# Patient Record
Sex: FEMALE | Race: WHITE | URBAN - METROPOLITAN AREA
[De-identification: names, ages, dates, MRNs, and addresses within clinical notes are randomized per-mention and may not be internally consistent; named-entity substitution may affect disease eponyms.]

---

## 2017-02-13 DIAGNOSIS — Z76.0 MEDICATION REFILL: ICD-10-CM

## 2017-02-13 RX ORDER — LORAZEPAM 0.5 MG/1
0.5 TABLET ORAL
Qty: 60 TAB | Refills: 0 | OUTPATIENT
Start: 2017-02-13

## 2017-02-13 NOTE — TELEPHONE ENCOUNTER
Patient request, last /16 ( has been seen twice downstairs since then but not with PCP). Requested Prescriptions     Pending Prescriptions Disp Refills    LORazepam (ATIVAN) 0.5 mg tablet 60 Tab 0     Sig: Take 1 Tab by mouth every eight (8) hours as needed for Anxiety.

## 2017-02-14 NOTE — TELEPHONE ENCOUNTER
reviewed. Medication was last filled in March 2016, #60. An appointment is needed to further discuss prior to refill.

## 2017-02-27 ENCOUNTER — OFFICE VISIT (OUTPATIENT)
Dept: INTERNAL MEDICINE CLINIC | Age: 67
End: 2017-02-27

## 2017-02-27 VITALS
HEIGHT: 62 IN | RESPIRATION RATE: 20 BRPM | HEART RATE: 90 BPM | BODY MASS INDEX: 20.98 KG/M2 | WEIGHT: 114 LBS | DIASTOLIC BLOOD PRESSURE: 80 MMHG | SYSTOLIC BLOOD PRESSURE: 108 MMHG | TEMPERATURE: 96.2 F | OXYGEN SATURATION: 98 %

## 2017-02-27 DIAGNOSIS — Z76.0 MEDICATION REFILL: ICD-10-CM

## 2017-02-27 DIAGNOSIS — F41.9 ANXIETY: Primary | ICD-10-CM

## 2017-02-27 RX ORDER — LOTEPREDNOL ETABONATE 5 MG/ML
1 SUSPENSION/ DROPS OPHTHALMIC 4 TIMES DAILY
Qty: 15 ML | Refills: 2 | Status: SHIPPED | OUTPATIENT
Start: 2017-02-27 | End: 2018-01-22 | Stop reason: SDUPTHER

## 2017-02-27 RX ORDER — BEPOTASTINE BESILATE 15 MG/ML
1 SOLUTION/ DROPS OPHTHALMIC
Qty: 10 ML | Refills: 2 | Status: SHIPPED | OUTPATIENT
Start: 2017-02-27 | End: 2018-01-22 | Stop reason: SDUPTHER

## 2017-02-27 RX ORDER — LORAZEPAM 0.5 MG/1
0.5 TABLET ORAL
Qty: 60 TAB | Refills: 0 | Status: SHIPPED | OUTPATIENT
Start: 2017-02-27 | End: 2018-01-11 | Stop reason: SDUPTHER

## 2017-02-27 NOTE — PROGRESS NOTES
HISTORY OF PRESENT ILLNESS  Carter Lancaster is a 61 y.o. female. HPI Comments: 62 yo female here for f/u of anxiety. Chronic issue. More stress recently with spouse's dx of melanoma. Takes lorazepam prn which has been effective. Also meditates which helps. Needs eye drops refilled for allergic conjunctivitis. Has been controlled. Review of Systems   Constitutional: Negative for chills, fever and weight loss. HENT: Negative for congestion. Eyes: Negative for blurred vision, pain and discharge. Respiratory: Negative for cough and shortness of breath. Cardiovascular: Negative for chest pain, palpitations and leg swelling. Gastrointestinal: Negative for nausea and vomiting. Musculoskeletal: Positive for joint pain. Neurological: Negative for dizziness, tingling and headaches. Psychiatric/Behavioral: Negative for depression. The patient is not nervous/anxious. Past Medical History:   Diagnosis Date    Anxiety     History of shingles     since age 17    Migraine      Current Outpatient Prescriptions on File Prior to Visit   Medication Sig Dispense Refill    EVAMIST 1.53 mg/spray (1.7%) spry       valACYclovir (VALTREX) 500 mg tablet Take 1 Tab by mouth two (2) times a day. 30 Tab 5    tretinoin (RETIN-A) 0.05 % topical cream APPLY A THIN FILM TOPICALLY EVERY EVENING 45 g 2    SUMAtriptan (IMITREX) 100 mg tablet Take 1 Tab by mouth once as needed for Migraine for up to 1 dose. 10 Tab 2    LORazepam (ATIVAN) 0.5 mg tablet Take 1 Tab by mouth every eight (8) hours as needed for Anxiety. 60 Tab 0    loratadine-pseudoephedrine (CLARITIN-D 24-HOUR)  mg per tablet Take 1 Tab by mouth daily.  SUMAtriptan (IMITREX) 20 mg/actuation nasal spray SPRAY 1 SPRAY IN BOTH NOSTRILS AS NEEDED FOR MIGRAINE (MAXIMUM OF 40MG IN 24HRS) 6 Container 1    butalbital-aspirin-caffeine (FIORINAL) -40 mg tablet Take 1 Tab by mouth every six (6) hours as needed for Pain.  30 Tab 1    mupirocin (BACTROBAN) 2 % ointment Apply  to affected area daily. 22 g 0     No current facility-administered medications on file prior to visit. Physical Exam   Constitutional: She is oriented to person, place, and time. She appears well-developed and well-nourished. No distress. /80 (BP 1 Location: Left arm, BP Patient Position: Sitting)  Pulse 90  Temp 96.2 °F (35.7 °C) (Oral)   Resp 20  Ht 5' 2\" (1.575 m)  Wt 114 lb (51.7 kg)  SpO2 98%  BMI 20.85 kg/m2     Eyes: EOM are normal. Right eye exhibits no discharge. Left eye exhibits no discharge. No scleral icterus. Cardiovascular: Normal rate, regular rhythm and normal heart sounds. Exam reveals no gallop and no friction rub. No murmur heard. Pulmonary/Chest: Effort normal and breath sounds normal. No respiratory distress. She has no wheezes. She has no rales. Neurological: She is alert and oriented to person, place, and time. Skin: Skin is warm and dry. Psychiatric: She has a normal mood and affect. No results found for: NA, K, CL, CO2, AGAP, GLU, BUN, CREA, BUCR, GFRAA, GFRNA, CA, TBIL, TBILI, GPT, SGOT, AP, TP, ALB, GLOB, AGRAT, ALT   No results found for: CHOL, CHOLPOCT, CHOLX, CHLST, CHOLV, HDL, HDLPOC, LDL, LDLCPOC, NLDLCT, DLDL, LDLC, DLDLP, VLDLC, VLDL, TGL, TGLX, TRIGL, [id number], TRIGP, TGLPOCT, CHHD, CHHDX  ASSESSMENT and PLAN    ICD-10-CM ICD-9-CM    1. Anxiety F41.9 300.00    2. Medication refill Z76.0 V68.1 LORazepam (ATIVAN) 0.5 mg tablet   Continue with lorazepam at current dose for now. Continue with nonpharm measures such as meditation. Eye drops refilled for allergic sx.

## 2017-02-27 NOTE — PATIENT INSTRUCTIONS

## 2017-02-27 NOTE — MR AVS SNAPSHOT
Visit Information Date & Time Provider Department Dept. Phone Encounter #  
 2/27/2017  7:45 AM [address] [phone] [id number] Follow-up Instructions Return if symptoms worsen or fail to improve, for anxiety. Upcoming Health Maintenance Date Due Hepatitis C Screening 1950 COLONOSCOPY 3/14/1970 DTaP/Tdap/Td series (1 - Tdap) 3/14/1973 BREAST CANCER SCRN MAMMOGRAM 3/14/2002 ZOSTER VACCINE AGE 60> 3/14/2012 INFLUENZA AGE 9 TO ADULT 8/1/2016 Allergies as of 2/27/2017  Review Complete On: 2/27/2017 By: Cayden Petersen LPN Severity Noted Reaction Type Reactions Sulfa (Sulfonamide Antibiotics)  03/02/2010    Unknown (comments) Pt says that she is allergic to \"all antibiotics except Amoxicillin\" Current Immunizations  Never Reviewed No immunizations on file. Not reviewed this visit You Were Diagnosed With   
  
 Codes Comments Anxiety    -  Primary ICD-10-CM: F41.9 ICD-9-CM: 300.00 Medication refill     ICD-10-CM: Z76.0 ICD-9-CM: V68.1 Vitals BP  
  
  
  
  
  
 108/80 (BP 1 Location: Left arm, BP Patient Position: Sitting) BMI and BSA Data Body Mass Index Body Surface Area  
 20.85 kg/m 2 1.5 m 2 Preferred Pharmacy Pharmacy Name Phone [address] [phone] Your Updated Medication List  
  
   
This list is accurate as of: 2/27/17  8:28 AM.  Always use your most recent med list.  
  
  
  
  
 Bepotastine Besilate 1.5 % Drop Commonly known as:  Renetta Loss Apply 1 Drop to eye two (2) times daily as needed. butalbital-aspirin-caffeine -40 mg tablet Commonly known as:  Jacquenettkaren Whitley Take 1 Tab by mouth every six (6) hours as needed for Pain. EVAMIST 1.53 mg/spray (1.7%) Spry Generic drug:  Estradiol  
  
 loratadine-pseudoephedrine  mg per tablet Commonly known as:  CLARITIN-D 24-hour Take 1 Tab by mouth daily. LORazepam 0.5 mg tablet Commonly known as:  ATIVAN Take 1 Tab by mouth every eight (8) hours as needed for Anxiety. Indications: ANXIETY  
  
 loteprednol etabonate 0.5 % ophthalmic suspension Commonly known as:  Michela Prieto Administer 1 Drop to both eyes four (4) times daily. Indications: ALLERGIC CONJUNCTIVITIS  
  
 mupirocin 2 % ointment Commonly known as:  [employer] Apply  to affected area daily. SUMAtriptan 100 mg tablet Commonly known as:  IMITREX Take 1 Tab by mouth once as needed for Migraine for up to 1 dose. SUMAtriptan 20 mg/actuation nasal spray Commonly known as:  IMITREX  
SPRAY 1 SPRAY IN BOTH NOSTRILS AS NEEDED FOR MIGRAINE (MAXIMUM OF 40MG IN 24HRS)  
  
 tretinoin 0.05 % topical cream  
Commonly known as:  RETIN-A  
APPLY A THIN FILM TOPICALLY EVERY EVENING  
  
 valACYclovir 500 mg tablet Commonly known as:  VALTREX Take 1 Tab by mouth two (2) times a day. Prescriptions Printed Refills LORazepam (ATIVAN) 0.5 mg tablet 0 Sig: Take 1 Tab by mouth every eight (8) hours as needed for Anxiety. Indications: ANXIETY Class: Print Route: Oral  
  
Prescriptions Sent to Pharmacy Refills  
 loteprednol etabonate (LOTEMAX) 0.5 % ophthalmic suspension 2 Sig: Administer 1 Drop to both eyes four (4) times daily. Indications: ALLERGIC CONJUNCTIVITIS Class: Normal  
 Pharmacy: [address] [place] [address] [facility], [address] Ph #: [phone] Route: Both Eyes Bepotastine Besilate (BEPREVE) 1.5 % drop 2 Sig: Apply 1 Drop to eye two (2) times daily as needed. Class: Normal  
 Pharmacy: [facility], [address] Ph #: [phone] Route: Ophthalmic Follow-up Instructions Return if symptoms worsen or fail to improve, for anxiety. Patient Instructions Anxiety Disorder: Care Instructions Your Care Instructions Anxiety is a normal reaction to stress. Difficult situations can cause you to have symptoms such as sweaty palms and a nervous feeling. In an anxiety disorder, the symptoms are far more severe. Constant worry, muscle tension, trouble sleeping, nausea and diarrhea, and other symptoms can make normal daily activities difficult or impossible. These symptoms may occur for no reason, and they can affect your work, school, or social life. Medicines, counseling, and self-care can all help. Follow-up care is a key part of your treatment and safety. Be sure to make and go to all appointments, and call your doctor if you are having problems. It's also a good idea to know your test results and keep a list of the medicines you take. How can you care for yourself at home? · Take medicines exactly as directed. Call your doctor if you think you are having a problem with your medicine. · Go to your counseling sessions and follow-up appointments. · Recognize and accept your anxiety. Then, when you are in a situation that makes you anxious, say to yourself, \"This is not an emergency. I feel uncomfortable, but I am not in danger. I can keep going even if I feel anxious. \" · Be kind to your body: ¨ Relieve tension with exercise or a massage. ¨ Get enough rest. 
¨ Avoid alcohol, caffeine, nicotine, and illegal drugs. They can increase your anxiety level and cause sleep problems. ¨ Learn and do relaxation techniques. See below for more about these techniques. · Engage your mind. Get out and do something you enjoy. Go to a funny movie, or take a walk or hike. Plan your day. Having too much or too little to do can make you anxious. · Keep a record of your symptoms. Discuss your fears with a good friend or family member, or join a support group for people with similar problems. Talking to others sometimes relieves stress. · Get involved in social groups, or volunteer to help others.  Being alone sometimes makes things seem worse than they are. · Get at least 30 minutes of exercise on most days of the week to relieve stress. Walking is a good choice. You also may want to do other activities, such as running, swimming, cycling, or playing tennis or team sports. Relaxation techniques Do relaxation exercises 10 to 20 minutes a day. You can play soothing, relaxing music while you do them, if you wish. · Tell others in your house that you are going to do your relaxation exercises. Ask them not to disturb you. · Find a comfortable place, away from all distractions and noise. · Lie down on your back, or sit with your back straight. · Focus on your breathing. Make it slow and steady. · Breathe in through your nose. Breathe out through either your nose or mouth. · Breathe deeply, filling up the area between your navel and your rib cage. Breathe so that your belly goes up and down. · Do not hold your breath. · Breathe like this for 5 to 10 minutes. Notice the feeling of calmness throughout your whole body. As you continue to breathe slowly and deeply, relax by doing the following for another 5 to 10 minutes: · Tighten and relax each muscle group in your body. You can begin at your toes and work your way up to your head. · Imagine your muscle groups relaxing and becoming heavy. · Empty your mind of all thoughts. · Let yourself relax more and more deeply. · Become aware of the state of calmness that surrounds you. · When your relaxation time is over, you can bring yourself back to alertness by moving your fingers and toes and then your hands and feet and then stretching and moving your entire body. Sometimes people fall asleep during relaxation, but they usually wake up shortly afterward. · Always give yourself time to return to full alertness before you drive a car or do anything that might cause an accident if you are not fully alert. Never play a relaxation tape while you drive a car. When should you call for help? Call 911 anytime you think you may need emergency care. For example, call if: 
· You feel you cannot stop from hurting yourself or someone else. Keep the numbers for these national suicide hotlines: 4-161-719-TALK (8-834-880-360.316.3687) and 4-700-CVUOQYO (8-112.186.5095). If you or someone you know talks about suicide or feeling hopeless, get help right away. Watch closely for changes in your health, and be sure to contact your doctor if: 
· You have anxiety or fear that affects your life. · You have symptoms of anxiety that are new or different from those you had before. Where can you learn more? Go to http://horacio-yordan.info/. Enter P754 in the search box to learn more about \"Anxiety Disorder: Care Instructions. \" Current as of: July 26, 2016 Content Version: 11.1 © 2607-8916 [employer]. Care instructions adapted under license by [employer] (which disclaims liability or warranty for this information). If you have questions about a medical condition or this instruction, always ask your healthcare professional. [address] any warranty or liability for your use of this information. Introducing [address]! Romayne Duster introduces [employer] patient portal. Now you can access parts of your medical record, email your doctor's office, and request medication refills online. 1. In your internet browser, go to [email or web address] 2. Click on the First Time User? Click Here link in the Sign In box. You will see the New Member Sign Up page. 3. Enter your [employer] Access Code exactly as it appears below. You will not need to use this code after youve completed the sign-up process. If you do not sign up before the expiration date, you must request a new code. · [employer] Access Code: [email or web address] Expires: 5/28/2017  7:51 AM 
 
 4. Enter the last four digits of your Social Security Number (xxxx) and Date of Birth (mm/dd/yyyy) as indicated and click Submit. You will be taken to the next sign-up page. 5. Create a RetAPPs ID. This will be your [employer] login ID and cannot be changed, so think of one that is secure and easy to remember. 6. Create a [employer] password. You can change your password at any time. 7. Enter your Password Reset Question and Answer. This can be used at a later time if you forget your password. 8. Enter your e-mail address. You will receive e-mail notification when new information is available in [address] 9. Click Sign Up. You can now view and download portions of your medical record. 10. Click the Download Summary menu link to download a portable copy of your medical information. If you have questions, please visit the Frequently Asked Questions section of the RetAPPs website. Remember, [employer] is NOT to be used for urgent needs. For medical emergencies, dial 911. Now available from your iPhone and Android! Please provide this summary of care documentation to your next provider. Your primary care clinician is listed as Elias Prescott. If you have any questions after today's visit, please call [phone].

## 2017-02-27 NOTE — PROGRESS NOTES
Patient presents today establish care with Dr. Dennie Mews and medication refills  Pt preferred language for healthcare discussion is english. Do you have an advanced directive? No  Is someone accompanying this pt? If so who? No   Is the patient using any DME equipment during OV? No What equipment? 1. Have you been to the ER, urgent care clinic since your last visit? Hospitalized since your last visit?  No

## 2017-08-15 DIAGNOSIS — Z76.0 MEDICATION REFILL: ICD-10-CM

## 2017-08-16 RX ORDER — TRETINOIN 0.5 MG/G
CREAM TOPICAL
Qty: 45 G | Refills: 1 | Status: SHIPPED | OUTPATIENT
Start: 2017-08-16 | End: 2018-01-22 | Stop reason: SDUPTHER

## 2017-08-17 DIAGNOSIS — Z76.0 MEDICATION REFILL: ICD-10-CM

## 2017-08-17 RX ORDER — SUMATRIPTAN 20 MG/1
SPRAY NASAL
Qty: 6 CONTAINER | Refills: 1 | Status: SHIPPED | OUTPATIENT
Start: 2017-08-17 | End: 2017-09-01 | Stop reason: SDUPTHER

## 2017-08-17 NOTE — TELEPHONE ENCOUNTER
Requested Prescriptions     Pending Prescriptions Disp Refills    SUMAtriptan (IMITREX) 20 mg/actuation nasal spray 6 Container 1     Sig: SPRAY 1 SPRAY IN BOTH NOSTRILS AS NEEDED FOR MIGRAINE (MAXIMUM OF 40MG IN 24HRS)

## 2017-09-01 DIAGNOSIS — Z76.0 MEDICATION REFILL: ICD-10-CM

## 2017-09-01 RX ORDER — SUMATRIPTAN 20 MG/1
SPRAY NASAL
Qty: 6 CONTAINER | Refills: 1 | Status: SHIPPED | OUTPATIENT
Start: 2017-09-01 | End: 2018-01-22 | Stop reason: SDUPTHER

## 2017-12-07 DIAGNOSIS — Z86.19 HISTORY OF COLD SORES: ICD-10-CM

## 2017-12-07 DIAGNOSIS — Z76.0 MEDICATION REFILL: ICD-10-CM

## 2017-12-07 RX ORDER — VALACYCLOVIR HYDROCHLORIDE 500 MG/1
TABLET, FILM COATED ORAL
Qty: 30 TAB | Refills: 5 | Status: SHIPPED | OUTPATIENT
Start: 2017-12-07 | End: 2018-01-22 | Stop reason: SDUPTHER

## 2018-01-11 ENCOUNTER — OFFICE VISIT (OUTPATIENT)
Dept: INTERNAL MEDICINE CLINIC | Age: 68
End: 2018-01-11

## 2018-01-11 VITALS
HEIGHT: 62 IN | HEART RATE: 100 BPM | TEMPERATURE: 98.9 F | RESPIRATION RATE: 14 BRPM | DIASTOLIC BLOOD PRESSURE: 76 MMHG | BODY MASS INDEX: 19.51 KG/M2 | OXYGEN SATURATION: 96 % | WEIGHT: 106 LBS | SYSTOLIC BLOOD PRESSURE: 145 MMHG

## 2018-01-11 DIAGNOSIS — Z76.0 MEDICATION REFILL: ICD-10-CM

## 2018-01-11 DIAGNOSIS — R05.9 COUGH: Primary | ICD-10-CM

## 2018-01-11 DIAGNOSIS — J40 BRONCHITIS: ICD-10-CM

## 2018-01-11 RX ORDER — LORAZEPAM 0.5 MG/1
0.5 TABLET ORAL
Qty: 60 TAB | Refills: 0 | Status: SHIPPED | OUTPATIENT
Start: 2018-01-11 | End: 2018-01-22 | Stop reason: SDUPTHER

## 2018-01-11 RX ORDER — DOXYCYCLINE 100 MG/1
100 CAPSULE ORAL 2 TIMES DAILY
Qty: 20 CAP | Refills: 0 | Status: SHIPPED | OUTPATIENT
Start: 2018-01-11 | End: 2018-01-21

## 2018-01-11 NOTE — PROGRESS NOTES
HISTORY OF PRESENT ILLNESS  Mariza Curtis is a 74 y.o. female. Visit Vitals    /76 (BP 1 Location: Right arm, BP Patient Position: Sitting)    Pulse 100    Temp 98.9 °F (37.2 °C) (Oral)    Resp 14    Ht 5' 2\" (1.575 m)    Wt 106 lb (48.1 kg)    SpO2 96%    BMI 19.39 kg/m2       HPI Comments: About 10 days ago developed a cough with low grade fever. Extreme fatigue. Tried a lot of home remedies and just is not getting better. Left lower rib cage hurts    Cough   The history is provided by the patient. This is a new problem. The current episode started more than 1 week ago. The problem occurs daily. The problem has not changed since onset. Nothing relieves the symptoms. Treatments tried: mult home remedies. The treatment provided no relief. Review of Systems   Constitutional: Positive for diaphoresis, fever and malaise/fatigue. HENT: Positive for congestion. Respiratory: Positive for cough and sputum production. Cardiovascular:        Left rib cage pain       Physical Exam   Constitutional: She is oriented to person, place, and time. She appears well-developed and well-nourished. She appears ill. HENT:   Right Ear: Tympanic membrane, external ear and ear canal normal.   Left Ear: Tympanic membrane, external ear and ear canal normal.   Mouth/Throat: Uvula is midline and oropharynx is clear and moist.   Cardiovascular: Normal rate and regular rhythm. Pulmonary/Chest: Effort normal. No respiratory distress. She has rales (musical rales centrally  lower half and heard best posteriorly. ). Musculoskeletal: She exhibits no edema. Lymphadenopathy:     She has no cervical adenopathy. Neurological: She is alert and oriented to person, place, and time. Skin: Skin is warm and dry. She is not diaphoretic. Psychiatric: She has a normal mood and affect. Nursing note and vitals reviewed. ASSESSMENT and PLAN    ICD-10-CM ICD-9-CM    1.  Cough R05 786.2 XR CHEST PA LAT      doxycycline (VIBRAMYCIN) 100 mg capsule   2. Bronchitis J40 490 XR CHEST PA LAT      doxycycline (VIBRAMYCIN) 100 mg capsule   3. Medication refill Z76.0 V68.1 LORazepam (ATIVAN) 0.5 mg tablet     Persistent cough with systemic sxs persisting over 10 days in spite of OTC txs   CXR clear to my exam    Pt is very sensitive to antibiotics but says she can take doxycycline    Start doxycycline 100 mg BID.  Continue the OTC Robitussin DM    Rest and fluids    F/u prn

## 2018-01-11 NOTE — PATIENT INSTRUCTIONS
Bronchitis: Care Instructions  Your Care Instructions    Bronchitis is inflammation of the bronchial tubes, which carry air to the lungs. The tubes swell and produce mucus, or phlegm. The mucus and inflamed bronchial tubes make you cough. You may have trouble breathing. Most cases of bronchitis are caused by viruses like those that cause colds. Antibiotics usually do not help and they may be harmful. Bronchitis usually develops rapidly and lasts about 2 to 3 weeks in otherwise healthy people. Follow-up care is a key part of your treatment and safety. Be sure to make and go to all appointments, and call your doctor if you are having problems. It's also a good idea to know your test results and keep a list of the medicines you take. How can you care for yourself at home? · Take all medicines exactly as prescribed. Call your doctor if you think you are having a problem with your medicine. · Get some extra rest.  · Take an over-the-counter pain medicine, such as acetaminophen (Tylenol), ibuprofen (Advil, Motrin), or naproxen (Aleve) to reduce fever and relieve body aches. Read and follow all instructions on the label. · Do not take two or more pain medicines at the same time unless the doctor told you to. Many pain medicines have acetaminophen, which is Tylenol. Too much acetaminophen (Tylenol) can be harmful. · Take an over-the-counter cough medicine that contains dextromethorphan to help quiet a dry, hacking cough so that you can sleep. Avoid cough medicines that have more than one active ingredient. Read and follow all instructions on the label. · Breathe moist air from a humidifier, hot shower, or sink filled with hot water. The heat and moisture will thin mucus so you can cough it out. · Do not smoke. Smoking can make bronchitis worse. If you need help quitting, talk to your doctor about stop-smoking programs and medicines. These can increase your chances of quitting for good.   When should you call for help? Call 911 anytime you think you may need emergency care. For example, call if:  ? · You have severe trouble breathing. ?Call your doctor now or seek immediate medical care if:  ? · You have new or worse trouble breathing. ? · You cough up dark brown or bloody mucus (sputum). ? · You have a new or higher fever. ? · You have a new rash. ? Watch closely for changes in your health, and be sure to contact your doctor if:  ? · You cough more deeply or more often, especially if you notice more mucus or a change in the color of your mucus. ? · You are not getting better as expected. Where can you learn more? Go to http://horacio-yordan.info/. Enter H333 in the search box to learn more about \"Bronchitis: Care Instructions. \"  Current as of: May 12, 2017  Content Version: 11.4  © 0133-1803 [employer]. Care instructions adapted under license by [employer] (which disclaims liability or warranty for this information). If you have questions about a medical condition or this instruction, always ask your healthcare professional. [address] any warranty or liability for your use of this information.

## 2018-01-11 NOTE — MR AVS SNAPSHOT
Visit Information Date & Time Provider Department Dept. Phone Encounter #  
 1/11/2018  2:15 PM [address] [phone] [id number] Follow-up Instructions Return if symptoms worsen or fail to improve, for or as appointed. Upcoming Health Maintenance Date Due Hepatitis C Screening 1950 COLONOSCOPY 3/14/1970 DTaP/Tdap/Td series (1 - Tdap) 3/14/1973 BREAST CANCER SCRN MAMMOGRAM 3/14/2002 ZOSTER VACCINE AGE 60> 1/14/2012 OSTEOPOROSIS SCREENING (DEXA) 3/14/2017 Pneumococcal 65+ Low/Medium Risk (1 of 2 - PCV13) 3/14/2017 MEDICARE YEARLY EXAM 3/14/2017 Influenza Age 5 to Adult 8/1/2017 GLAUCOMA SCREENING Q2Y 3/21/2018 Allergies as of 1/11/2018  Review Complete On: 1/11/2018 By: Rubi Willett MD  
  
 Severity Noted Reaction Type Reactions Keflex [Cephalexin]  01/11/2018    Rash And GI sxs Sulfa (Sulfonamide Antibiotics)  03/02/2010    Unknown (comments) Pt says that she is allergic to \"all antibiotics except Amoxicillin\" Zithromax [Azithromycin]  01/11/2018    Other (comments) GI upset Current Immunizations  Never Reviewed No immunizations on file. Not reviewed this visit You Were Diagnosed With   
  
 Codes Comments Cough    -  Primary ICD-10-CM: I67 ICD-9-CM: 786.2 Bronchitis     ICD-10-CM: J40 ICD-9-CM: 851 Vitals BP Pulse Temp Resp Height(growth percentile) Weight(growth percentile) 145/76 (BP 1 Location: Right arm, BP Patient Position: Sitting) 100 98.9 °F (37.2 °C) (Oral) 14 5' 2\" (1.575 m) 106 lb (48.1 kg) SpO2 BMI OB Status Smoking Status 96% 19.39 kg/m2 Hysterectomy Never Smoker BMI and BSA Data Body Mass Index Body Surface Area  
 19.39 kg/m 2 1.45 m 2 Preferred Pharmacy Pharmacy Name Phone [facility] - [address], [address] [phone] Your Updated Medication List  
  
   
 This list is accurate as of: 1/11/18  3:13 PM.  Always use your most recent med list.  
  
  
  
  
 Bepotastine Besilate 1.5 % Drop Commonly known as:  Christina Berg Apply 1 Drop to eye two (2) times daily as needed. butalbital-aspirin-caffeine -40 mg tablet Commonly known as:  Charma Jayleen Take 1 Tab by mouth every six (6) hours as needed for Pain. doxycycline 100 mg capsule Commonly known as:  VIBRAMYCIN Take 1 Cap by mouth two (2) times a day for 10 days. EVAMIST 1.53 mg/spray (1.7%) Spry Generic drug:  Estradiol  
  
 loratadine-pseudoephedrine  mg per tablet Commonly known as:  CLARITIN-D 24-hour Take 1 Tab by mouth daily. LORazepam 0.5 mg tablet Commonly known as:  ATIVAN Take 1 Tab by mouth every eight (8) hours as needed for Anxiety. Indications: ANXIETY  
  
 loteprednol etabonate 0.5 % ophthalmic suspension Commonly known as:  Tali Betty Administer 1 Drop to both eyes four (4) times daily. Indications: ALLERGIC CONJUNCTIVITIS  
  
 mupirocin 2 % ointment Commonly known as:  [employer] Apply  to affected area daily. RETIN-A 0.05 % topical cream  
Generic drug:  tretinoin APPLY THIN FILM TOPICALLY EVERY EVENING  
  
 SUMAtriptan 100 mg tablet Commonly known as:  IMITREX Take 1 Tab by mouth once as needed for Migraine for up to 1 dose. SUMAtriptan 20 mg/actuation nasal spray Commonly known as:  IMITREX  
SPRAY 1 SPRAY IN BOTH NOSTRILS AS NEEDED FOR MIGRAINE (MAXIMUM OF 40MG IN 24HRS)  
  
 valACYclovir 500 mg tablet Commonly known as:  VALTREX  
TAKE 1 TABLET TWICE A DAY Prescriptions Sent to Pharmacy Refills  
 doxycycline (VIBRAMYCIN) 100 mg capsule 0 Sig: Take 1 Cap by mouth two (2) times a day for 10 days. Class: Normal  
 Pharmacy: [address] [place], [place] [address]y [place], [address] Ph #: [phone] Route: Oral  
  
Follow-up Instructions Return if symptoms worsen or fail to improve, for or as appointed. To-Do List   
 01/11/2018 Imaging:  XR CHEST PA LAT Patient Instructions Bronchitis: Care Instructions Your Care Instructions Bronchitis is inflammation of the bronchial tubes, which carry air to the lungs. The tubes swell and produce mucus, or phlegm. The mucus and inflamed bronchial tubes make you cough. You may have trouble breathing. Most cases of bronchitis are caused by viruses like those that cause colds. Antibiotics usually do not help and they may be harmful. Bronchitis usually develops rapidly and lasts about 2 to 3 weeks in otherwise healthy people. Follow-up care is a key part of your treatment and safety. Be sure to make and go to all appointments, and call your doctor if you are having problems. It's also a good idea to know your test results and keep a list of the medicines you take. How can you care for yourself at home? · Take all medicines exactly as prescribed. Call your doctor if you think you are having a problem with your medicine. · Get some extra rest. 
· Take an over-the-counter pain medicine, such as acetaminophen (Tylenol), ibuprofen (Advil, Motrin), or naproxen (Aleve) to reduce fever and relieve body aches. Read and follow all instructions on the label. · Do not take two or more pain medicines at the same time unless the doctor told you to. Many pain medicines have acetaminophen, which is Tylenol. Too much acetaminophen (Tylenol) can be harmful. · Take an over-the-counter cough medicine that contains dextromethorphan to help quiet a dry, hacking cough so that you can sleep. Avoid cough medicines that have more than one active ingredient. Read and follow all instructions on the label. · Breathe moist air from a humidifier, hot shower, or sink filled with hot water. The heat and moisture will thin mucus so you can cough it out. · Do not smoke. Smoking can make bronchitis worse. If you need help quitting, talk to your doctor about stop-smoking programs and medicines. These can increase your chances of quitting for good. When should you call for help? Call 911 anytime you think you may need emergency care. For example, call if: 
? · You have severe trouble breathing. ?Call your doctor now or seek immediate medical care if: 
? · You have new or worse trouble breathing. ? · You cough up dark brown or bloody mucus (sputum). ? · You have a new or higher fever. ? · You have a new rash. ? Watch closely for changes in your health, and be sure to contact your doctor if: 
? · You cough more deeply or more often, especially if you notice more mucus or a change in the color of your mucus. ? · You are not getting better as expected. Where can you learn more? Go to http://horacio-yordan.info/. Enter H333 in the search box to learn more about \"Bronchitis: Care Instructions. \" Current as of: May 12, 2017 Content Version: 11.4 © 0775-5732 [employer]. Care instructions adapted under license by [employer] (which disclaims liability or warranty for this information). If you have questions about a medical condition or this instruction, always ask your healthcare professional. [address] any warranty or liability for your use of this information. Introducing [facility]! [facility] introduces [employer] patient portal. Now you can access parts of your medical record, email your doctor's office, and request medication refills online. 1. In your internet browser, go to [email or web address] 2. Click on the First Time User? Click Here link in the Sign In box. You will see the New Member Sign Up page. 3. Enter your [employer] Access Code exactly as it appears below. You will not need to use this code after youve completed the sign-up process.  If you do not sign up before the expiration date, you must request a new code. · Instabug Access Code: [facility] Expires: 4/11/2018  3:13 PM 
 
4. Enter the last four digits of your Social Security Number (xxxx) and Date of Birth (mm/dd/yyyy) as indicated and click Submit. You will be taken to the next sign-up page. 5. Create a Instabug ID. This will be your Instabug login ID and cannot be changed, so think of one that is secure and easy to remember. 6. Create a Instabug password. You can change your password at any time. 7. Enter your Password Reset Question and Answer. This can be used at a later time if you forget your password. 8. Enter your e-mail address. You will receive e-mail notification when new information is available in [address] 9. Click Sign Up. You can now view and download portions of your medical record. 10. Click the Download Summary menu link to download a portable copy of your medical information. If you have questions, please visit the Frequently Asked Questions section of the Inst[employer] website. Remember, Inst[employer]ug is NOT to be used for urgent needs. For medical emergencies, dial 911. Now available from your iPhone and Android! Please provide this summary of care documentation to your next provider. Your primary care clinician is listed as Elias [address]. If you have any questions after today's visit, please call [phone].

## 2018-01-11 NOTE — PROGRESS NOTES
Patient presents to the clinic for a cough that began 10 days ago. Patient complains of diarrhea, left side abdominal pain, lower back pain, body aches and fatigue. Patient also complains of a shingles outbreak. Flu Shot Requested: no    Depression Screening:  PHQ over the last two weeks 2/27/2017 7/13/2016 3/21/2016 3/16/2015   Little interest or pleasure in doing things Not at all Not at all Not at all Not at all   Feeling down, depressed or hopeless Not at all Not at all Not at all Not at all   Total Score PHQ 2 0 0 0 0       Learning Assessment:  Learning Assessment 3/16/2015   PRIMARY LEARNER Patient   HIGHEST LEVEL OF EDUCATION - PRIMARY LEARNER  4 YEARS OF COLLEGE   BARRIERS PRIMARY LEARNER NONE   PRIMARY LANGUAGE ENGLISH   LEARNER PREFERENCE PRIMARY READING   ANSWERED BY patrient   RELATIONSHIP SELF       Abuse Screening:  No flowsheet data found. Health Maintenance reviewed and discussed per provider: yes     Coordination of Care:    1. Have you been to the ER, urgent care clinic since your last visit? Hospitalized since your last visit? no    2. Have you seen or consulted any other health care providers outside of the [address] [place] [address] since your last visit? Include any pap smears or colon screening.  yes

## 2018-01-22 ENCOUNTER — OFFICE VISIT (OUTPATIENT)
Dept: INTERNAL MEDICINE CLINIC | Age: 68
End: 2018-01-22

## 2018-01-22 VITALS
HEIGHT: 62 IN | RESPIRATION RATE: 16 BRPM | BODY MASS INDEX: 20.24 KG/M2 | HEART RATE: 78 BPM | WEIGHT: 110 LBS | TEMPERATURE: 97 F | OXYGEN SATURATION: 98 % | DIASTOLIC BLOOD PRESSURE: 67 MMHG | SYSTOLIC BLOOD PRESSURE: 112 MMHG

## 2018-01-22 DIAGNOSIS — J06.9 UPPER RESPIRATORY TRACT INFECTION, UNSPECIFIED TYPE: ICD-10-CM

## 2018-01-22 DIAGNOSIS — F41.9 ANXIETY: Primary | ICD-10-CM

## 2018-01-22 DIAGNOSIS — Z86.19 HISTORY OF COLD SORES: ICD-10-CM

## 2018-01-22 DIAGNOSIS — Z76.0 MEDICATION REFILL: ICD-10-CM

## 2018-01-22 RX ORDER — LOTEPREDNOL ETABONATE 5 MG/ML
1 SUSPENSION/ DROPS OPHTHALMIC 4 TIMES DAILY
Qty: 15 ML | Refills: 2 | Status: SHIPPED | OUTPATIENT
Start: 2018-01-22 | End: 2018-09-06 | Stop reason: SDUPTHER

## 2018-01-22 RX ORDER — VALACYCLOVIR HYDROCHLORIDE 500 MG/1
TABLET, FILM COATED ORAL
Qty: 30 TAB | Refills: 5 | Status: CANCELLED | OUTPATIENT
Start: 2018-01-22

## 2018-01-22 RX ORDER — VALACYCLOVIR HYDROCHLORIDE 500 MG/1
TABLET, FILM COATED ORAL
Qty: 30 TAB | Refills: 5 | Status: SHIPPED | OUTPATIENT
Start: 2018-01-22 | End: 2018-09-06 | Stop reason: SDUPTHER

## 2018-01-22 RX ORDER — SUMATRIPTAN 20 MG/1
SPRAY NASAL
Qty: 6 CONTAINER | Refills: 1 | Status: SHIPPED | OUTPATIENT
Start: 2018-01-22 | End: 2018-09-06 | Stop reason: SDUPTHER

## 2018-01-22 RX ORDER — ESTRADIOL 1.53 MG/1
3 SPRAY TRANSDERMAL DAILY
Qty: 1 BOTTLE | Refills: 3 | Status: SHIPPED | OUTPATIENT
Start: 2018-01-22 | End: 2018-05-23 | Stop reason: SDUPTHER

## 2018-01-22 RX ORDER — TRETINOIN 0.5 MG/G
CREAM TOPICAL
Qty: 45 G | Refills: 1 | Status: SHIPPED | OUTPATIENT
Start: 2018-01-22 | End: 2018-09-06 | Stop reason: SDUPTHER

## 2018-01-22 RX ORDER — LORAZEPAM 0.5 MG/1
0.5 TABLET ORAL
Qty: 60 TAB | Refills: 0 | Status: SHIPPED | OUTPATIENT
Start: 2018-01-22 | End: 2018-09-06 | Stop reason: SDUPTHER

## 2018-01-22 RX ORDER — BEPOTASTINE BESILATE 15 MG/ML
1 SOLUTION/ DROPS OPHTHALMIC
Qty: 10 ML | Refills: 2 | Status: SHIPPED | OUTPATIENT
Start: 2018-01-22 | End: 2018-02-09 | Stop reason: CLARIF

## 2018-01-22 NOTE — PROGRESS NOTES
HISTORY OF PRESENT ILLNESS  Palomo Mackenzie is a 74 y.o. female. HPI Comments: 75 yo female here for f/u of anxiety, shingles, recent URI. Treated with abx with improvement of sx. Stamina improving. Anxiety sx stable. Using ativan prn. H/o shingles. Valtrex prn. Discussed new rx Shingrix      Review of Systems   Constitutional: Negative for chills, fever and weight loss. HENT: Negative for congestion and ear pain. Eyes: Negative for blurred vision and pain. Respiratory: Positive for cough. Negative for shortness of breath. Cardiovascular: Negative for chest pain, palpitations and leg swelling. Gastrointestinal: Negative for nausea and vomiting. Genitourinary: Negative for frequency and urgency. Musculoskeletal: Negative for joint pain and myalgias. Skin: Negative for itching and rash. Neurological: Negative for dizziness, tingling and headaches. Psychiatric/Behavioral: Negative for depression. The patient is not nervous/anxious. Past Medical History:   Diagnosis Date    Anxiety     History of shingles     since age 17    Migraine      Current Outpatient Prescriptions on File Prior to Visit   Medication Sig Dispense Refill    LORazepam (ATIVAN) 0.5 mg tablet Take 1 Tab by mouth every eight (8) hours as needed for Anxiety. Indications: anxiety 60 Tab 0    valACYclovir (VALTREX) 500 mg tablet TAKE 1 TABLET TWICE A DAY 30 Tab 5    SUMAtriptan (IMITREX) 20 mg/actuation nasal spray SPRAY 1 SPRAY IN BOTH NOSTRILS AS NEEDED FOR MIGRAINE (MAXIMUM OF 40MG IN 24HRS) 6 Container 1    RETIN-A 0.05 % topical cream APPLY THIN FILM TOPICALLY EVERY EVENING 45 g 1    loteprednol etabonate (LOTEMAX) 0.5 % ophthalmic suspension Administer 1 Drop to both eyes four (4) times daily. Indications: ALLERGIC CONJUNCTIVITIS 15 mL 2    Bepotastine Besilate (BEPREVE) 1.5 % drop Apply 1 Drop to eye two (2) times daily as needed.  10 mL 2    EVAMIST 1.53 mg/spray (1.7%) spry       mupirocin (BACTROBAN) 2 % ointment Apply  to affected area daily. 22 g 0    SUMAtriptan (IMITREX) 100 mg tablet Take 1 Tab by mouth once as needed for Migraine for up to 1 dose. 10 Tab 2    loratadine-pseudoephedrine (CLARITIN-D 24-HOUR)  mg per tablet Take 1 Tab by mouth daily.  butalbital-aspirin-caffeine (FIORINAL) -40 mg tablet Take 1 Tab by mouth every six (6) hours as needed for Pain. 30 Tab 1    [] doxycycline (VIBRAMYCIN) 100 mg capsule Take 1 Cap by mouth two (2) times a day for 10 days. 20 Cap 0     No current facility-administered medications on file prior to visit. Social History   Substance Use Topics    Smoking status: Never Smoker    Smokeless tobacco: Never Used    Alcohol use No     Physical Exam   Constitutional: She appears well-developed and well-nourished. No distress. /67 (BP 1 Location: Right arm, BP Patient Position: Sitting)  Pulse 78  Temp 97 °F (36.1 °C) (Oral)   Resp 16  Ht 5' 2\" (1.575 m)  Wt 110 lb (49.9 kg)  SpO2 98%  BMI 20.12 kg/m2     Eyes: EOM are normal. Right eye exhibits no discharge. Left eye exhibits no discharge. No scleral icterus. Neck: Neck supple. Cardiovascular: Normal rate, regular rhythm and normal heart sounds. Exam reveals no gallop and no friction rub. No murmur heard. Pulmonary/Chest: Effort normal. No respiratory distress. She has no wheezes. Musculoskeletal: She exhibits no edema or tenderness. Lymphadenopathy:     She has no cervical adenopathy. Neurological: She is alert. She exhibits normal muscle tone. Skin: Skin is warm and dry. Psychiatric: She has a normal mood and affect.      No results found for: NA, K, CL, CO2, AGAP, GLU, BUN, CREA, BUCR, GFRAA, GFRNA, CA, TBIL, TBILI, GPT, SGOT, AP, TP, ALB, GLOB, AGRAT, ALT  No results found for: WBC, WBCLT, HGBPOC, HGB, HGBP, HCTPOC, HCT, PHCT, RBCH, PLT, MCV, HGBEXT, HCTEXT, PLTEXT   No results found for: CHOL, CHOLPOCT, CHOLX, CHLST, CHOLV, HDL, HDLPOC, LDL, LDLCPOC, LDLC, DLDLP, VLDLC, VLDL, TGLX, TRIGL, TRIGP, TGLPOCT, CHHD, CHHDX    ASSESSMENT and PLAN    ICD-10-CM ICD-9-CM    1. Anxiety F41.9 300.00    2. Medication refill Z76.0 V68.1 LORazepam (ATIVAN) 0.5 mg tablet      SUMAtriptan (IMITREX) 20 mg/actuation nasal spray      tretinoin (RETIN-A) 0.05 % topical cream      valACYclovir (VALTREX) 500 mg tablet   3. History of cold sores Z86.19 V12.09 valACYclovir (VALTREX) 500 mg tablet   4. Upper respiratory tract infection, unspecified type J06.9 465.9      Stable at this time. URI sx improving. Will continue with current medication. She will consider screening labs in the future.    Rx given for Shingrix

## 2018-01-22 NOTE — MR AVS SNAPSHOT
[facility] 
 
 
 [address] [facility] [address] [address] 
[phone] Patient: Jorge Yen MRN: [id number] SUL:[birth year] Visit Information Date & Time Provider Department Dept. Phone Encounter #  
 1/22/2018  7:45 AM Stu BaezNascentric [phone] [id number] Follow-up Instructions Return if symptoms worsen or fail to improve. Upcoming Health Maintenance Date Due Hepatitis C Screening 1950 COLONOSCOPY 3/14/1970 DTaP/Tdap/Td series (1 - Tdap) 3/14/1973 BREAST CANCER SCRN MAMMOGRAM 3/14/2002 ZOSTER VACCINE AGE 60> 1/14/2012 OSTEOPOROSIS SCREENING (DEXA) 3/14/2017 Pneumococcal 65+ Low/Medium Risk (1 of 2 - PCV13) 3/14/2017 Influenza Age 5 to Adult 8/1/2017 GLAUCOMA SCREENING Q2Y 3/21/2018 MEDICARE YEARLY EXAM 1/22/2019 Allergies as of 1/22/2018  Review Complete On: 1/22/2018 By: Gregoria García Severity Noted Reaction Type Reactions Keflex [Cephalexin]  01/11/2018    Rash And GI sxs Sulfa (Sulfonamide Antibiotics)  03/02/2010    Unknown (comments) Pt says that she is allergic to \"all antibiotics except Amoxicillin\" Zithromax [Azithromycin]  01/11/2018    Other (comments) GI upset Current Immunizations  Never Reviewed No immunizations on file. Not reviewed this visit You Were Diagnosed With   
  
 Codes Comments Anxiety    -  Primary ICD-10-CM: F41.9 ICD-9-CM: 300.00 Medication refill     ICD-10-CM: Z76.0 ICD-9-CM: V68.1 History of cold sores     ICD-10-CM: Z86.19 ICD-9-CM: V12.09 Vitals BP Pulse Temp Resp Height(growth percentile) Weight(growth percentile) 112/67 (BP 1 Location: Right arm, BP Patient Position: Sitting) 78 97 °F (36.1 °C) (Oral) 16 5' 2\" (1.575 m) 110 lb (49.9 kg) SpO2 BMI OB Status Smoking Status 98% 20.12 kg/m2 Hysterectomy Never Smoker Vitals History BMI and BSA Data Body Mass Index Body Surface Area  
 20.12 kg/m 2 1.48 m 2 Preferred Pharmacy Pharmacy Name Phone [facility] - [place], [address] [phone] Your Updated Medication List  
  
   
This list is accurate as of: 1/22/18  8:10 AM.  Always use your most recent med list.  
  
  
  
  
 Bepotastine Besilate 1.5 % Drop Commonly known as:  Nba Brome Apply 1 Drop to eye two (2) times daily as needed. butalbital-aspirin-caffeine -40 mg tablet Commonly known as:  Wayna Speaks Take 1 Tab by mouth every six (6) hours as needed for Pain. EVAMIST 1.53 mg/spray (1.7%) Spry Generic drug:  Estradiol Apply 3 Sprays to affected area daily. loratadine-pseudoephedrine  mg per tablet Commonly known as:  CLARITIN-D 24-hour Take 1 Tab by mouth daily. LORazepam 0.5 mg tablet Commonly known as:  ATIVAN Take 1 Tab by mouth every eight (8) hours as needed for Anxiety. Indications: anxiety  
  
 loteprednol etabonate 0.5 % ophthalmic suspension Commonly known as:  Renetta Amen Administer 1 Drop to both eyes four (4) times daily. Indications: Allergic Conjunctivitis  
  
 mupirocin 2 % ointment Commonly known as:  [employer] Apply  to affected area daily. SUMAtriptan 100 mg tablet Commonly known as:  IMITREX Take 1 Tab by mouth once as needed for Migraine for up to 1 dose. SUMAtriptan 20 mg/actuation nasal spray Commonly known as:  IMITREX  
SPRAY 1 SPRAY IN BOTH NOSTRILS AS NEEDED FOR MIGRAINE (MAXIMUM OF 40MG IN 24HRS)  
  
 tretinoin 0.05 % topical cream  
Commonly known as:  RETIN-A  
APPLY THIN FILM TOPICALLY EVERY EVENING  
  
 valACYclovir 500 mg tablet Commonly known as:  VALTREX  
TAKE 1 TABLET TWICE A DAY  
  
 varicella-zoster recombinant (PF) 50 mcg/0.5 mL Susr injection Commonly known as:  SHINGRIX (PF)  
0.5 mL by IntraMUSCular route once for 1 dose. Prescriptions Printed Refills LORazepam (ATIVAN) 0.5 mg tablet 0 Sig: Take 1 Tab by mouth every eight (8) hours as needed for Anxiety. Indications: anxiety Class: Print Route: Oral  
 varicella-zoster recombinant, PF, (SHINGRIX, PF,) 50 mcg/0.5 mL susr injection 0 Si.5 mL by IntraMUSCular route once for 1 dose. Class: Print Route: IntraMUSCular Prescriptions Sent to Pharmacy Refills SUMAtriptan (IMITREX) 20 mg/actuation nasal spray 1 Sig: SPRAY 1 SPRAY IN BOTH NOSTRILS AS NEEDED FOR MIGRAINE (MAXIMUM OF 40MG IN 24HRS) Class: Normal  
 Pharmacy: [address], [address] Ph #: [phone] EVAMIST 1.53 mg/spray (1.7%) spry 3 Sig: Apply 3 Sprays to affected area daily. Class: Normal  
 Pharmacy: [address], [address] Ph #: [phone] Route: Topical  
 Bepotastine Besilate (BEPREVE) 1.5 % drop 2 Sig: Apply 1 Drop to eye two (2) times daily as needed. Class: Normal  
 Pharmacy: [address], [address] Ph #: [phone] Route: Ophthalmic  
 loteprednol etabonate (LOTEMAX) 0.5 % ophthalmic suspension 2 Sig: Administer 1 Drop to both eyes four (4) times daily. Indications: Allergic Conjunctivitis Class: Normal  
 Pharmacy: [address], [address] Ph #: [phone] Route: Both Eyes  
 tretinoin (RETIN-A) 0.05 % topical cream 1 Sig: APPLY THIN FILM TOPICALLY EVERY EVENING Class: Normal  
 Pharmacy: [address] Ph #: [phone]  
 valACYclovir (VALTREX) 500 mg tablet 5 Sig: TAKE 1 TABLET TWICE A DAY Class: Normal  
 [facility] [address] [facility]: [phone] Follow-up Instructions Return if symptoms worsen or fail to improve. Introducing [facility]! [facility] introduces [employer] patient portal. Now you can access parts of your medical record, email your doctor's office, and request medication refills online. 1. In your internet browser, go to [email or web address] 2. Click on the First Time User? Click Here link in the Sign In box. You will see the New Member Sign Up page. 3. Enter your Sift [employer] Access Code exactly as it appears below. You will not need to use this code after youve completed the sign-up process. If you do not sign up before the expiration date, you must request a new code. · [employer] Access Code: [facility] Expires: 4/11/2018  3:13 PM 
 
4. Enter the last four digits of your Social Security Number (xxxx) and Date of Birth (mm/dd/yyyy) as indicated and click Submit. You will be taken to the next sign-up page. 5. Create a [employer] ID. This will be your [employer] login ID and cannot be changed, so think of one that is secure and easy to remember. 6. Create a [employer] password. You can change your password at any time. 7. Enter your Password Reset Question and Answer. This can be used at a later time if you forget your password. 8. Enter your e-mail address. You will receive e-mail notification when new information is available in [address] 9. Click Sign Up. You can now view and download portions of your medical record. 10. Click the Download Summary menu link to download a portable copy of your medical information. If you have questions, please visit the Frequently Asked Questions section of the [employer] website. Remember, [employer] is NOT to be used for urgent needs. For medical emergencies, dial 911. Now available from your iPhone and Android! Please provide this summary of care documentation to your next provider. Your primary care clinician is listed as Elias Trimble Avkaren. If you have any questions after today's visit, please call [phone].

## 2018-01-22 NOTE — PROGRESS NOTES
ROOM # 1115 [facility] presents today for   Chief Complaint   Patient presents with    Anxiety    Medication Refill       Toni Lima preferred language for health care discussion is english/other. Is someone accompanying this pt? no    Is the patient using any DME equipment during OV? no    Depression Screening:  PHQ over the last two weeks 2/27/2017 7/13/2016 3/21/2016 3/16/2015   Little interest or pleasure in doing things Not at all Not at all Not at all Not at all   Feeling down, depressed or hopeless Not at all Not at all Not at all Not at all   Total Score PHQ 2 0 0 0 0       Learning Assessment:  Learning Assessment 3/16/2015   PRIMARY LEARNER Patient   HIGHEST LEVEL OF EDUCATION - PRIMARY LEARNER  4 YEARS OF COLLEGE   BARRIERS PRIMARY LEARNER NONE   PRIMARY LANGUAGE ENGLISH   LEARNER PREFERENCE PRIMARY READING   ANSWERED BY patrient   RELATIONSHIP SELF       Abuse Screening:  Abuse Screening Questionnaire 1/22/2018   Do you ever feel afraid of your partner? N   Are you in a relationship with someone who physically or mentally threatens you? N   Is it safe for you to go home? Y       Fall Risk  No flowsheet data found. Health Maintenance reviewed and discussed per provider. Yes    Nelda Rodriguez is due for hep c screening, colonoscopy, tdap, mammogram, zoster (did rx for this), dexa pneu, influenza, glaucoma. Please order/place referral if appropriate. Advance Directive:  1. Do you have an advance directive in place? Patient Reply: no    2. If not, would you like material regarding how to put one in place? Patient Reply: no    Coordination of Care:  1. Have you been to the ER, urgent care clinic since your last visit? Hospitalized since your last visit? no    2. Have you seen or consulted any other health care providers outside of the [address] [place] [address] since your last visit? Include any pap smears or colon screening.  no

## 2018-02-02 ENCOUNTER — TELEPHONE (OUTPATIENT)
Dept: INTERNAL MEDICINE CLINIC | Age: 68
End: 2018-02-02

## 2018-02-02 NOTE — TELEPHONE ENCOUNTER
Per prior auth paperwork pt must have tried and failed at least 2 of the following formulary alternatives:    Olopatadine 0.1%  Olopatadine 0.7%  Azelastine  epinastine    Please switch to formulary alternative as previously ordered medication will not be approved without trial of listed medications.

## 2018-02-02 NOTE — TELEPHONE ENCOUNTER
Medication was being entered as a refill. Can we ask her if she has tried any of those alternatives.

## 2018-02-09 NOTE — TELEPHONE ENCOUNTER
Called spoke with patient verified with two identifiers she stated that she has always used Bepreve and it has been therapeutic.   She stated that if insurance wont pay for it she will try what they will pay for and it can go to [facility] the alternatives are:    Olopatadine 0.1%  Olopatadine 0.7%  Azelastine  epinastine

## 2018-05-22 ENCOUNTER — TELEPHONE (OUTPATIENT)
Dept: INTERNAL MEDICINE CLINIC | Age: 68
End: 2018-05-22

## 2018-05-22 NOTE — TELEPHONE ENCOUNTER
Patient called to let  know that this medication needs to be re written because the insurance will not pay.  Medication is Evamist

## 2018-05-24 NOTE — TELEPHONE ENCOUNTER
Spoke with pt who explained issues with Evamist. Will change sig to 4 sprays daily as she has needed 3-4 and dispense 6 bottles with refills.

## 2018-05-24 NOTE — TELEPHONE ENCOUNTER
Attempted to return  pt call at number listed, no answer. Lvm for pt to return call to office at [phone]. Will continue to try to contact pt.

## 2018-05-24 NOTE — TELEPHONE ENCOUNTER
Patient called in to check the status of the new Rx. States it was written incorrectly. Patient has waiting two days and states she has not heard back from anyone and is never able to get anyone on the phone so she is coming to the office to take care of this. States things keep getting messed up here and she doesn't have time to keep coming down here.

## 2018-05-24 NOTE — TELEPHONE ENCOUNTER
[facility] that [facility] 90 day fill. 1 Bottle only lasts 19 days. Which would be 5 bottles. PSR unable to select Estradiol (EVAMIST) 1.53mg/spray (1.7%) to future order in system. Preferred pharmacy is [facility].

## 2018-07-18 ENCOUNTER — DOCUMENTATION ONLY (OUTPATIENT)
Dept: INTERNAL MEDICINE CLINIC | Age: 68
End: 2018-07-18

## 2018-07-18 NOTE — LETTER
7/18/2018 12:36 PM 
 
Ms. Mima Rivera [address] [phone] Subject: Mammogram 
 
 
Dear Margot Arteaga: 
 
 
I hope this letter finds you well. Your good health is important to us, that is why we are sending you this friendly reminder. According to our records, it appears you may be due for your yearly mammogram.  Mammograms are important in detecting breast abnormalities and breast cancer in early stages. It is important to have this done on a regular basis. If you have already had this screening completed within the past year, please disregard this letter and have results faxed to our office at [phone], so that we can keep your medical record as up to date as possible. If you have not had this screening completed please contact our office via [employer] or (ph) [phone] requesting referral for mammogram and advise where you would like to have mammogram completed. If you have any questions, please do not hesitate to give us a call at the number listed above. As always, our goal is to be your partner in life-long wellness. Sincerely, Kerry Lopez LPN

## 2018-07-18 NOTE — PROGRESS NOTES
Attempted to contact pt at  number regarding mammogram that is due, no answer. Lvm for pt to return call to office at [phone]. I have been unable to reach this patient by phone. A letter is being sent to the last known home address regarding mammogram that is due.

## 2018-08-13 DIAGNOSIS — Z76.0 MEDICATION REFILL: ICD-10-CM

## 2018-08-13 RX ORDER — TRETINOIN 0.5 MG/G
CREAM TOPICAL
Qty: 45 G | Refills: 1 | Status: SHIPPED | OUTPATIENT
Start: 2018-08-13 | End: 2018-09-06 | Stop reason: SDUPTHER

## 2018-09-06 ENCOUNTER — OFFICE VISIT (OUTPATIENT)
Dept: INTERNAL MEDICINE CLINIC | Age: 68
End: 2018-09-06

## 2018-09-06 VITALS
BODY MASS INDEX: 20.32 KG/M2 | TEMPERATURE: 96.7 F | RESPIRATION RATE: 22 BRPM | HEIGHT: 62 IN | WEIGHT: 110.4 LBS | OXYGEN SATURATION: 99 % | HEART RATE: 83 BPM | DIASTOLIC BLOOD PRESSURE: 76 MMHG | SYSTOLIC BLOOD PRESSURE: 111 MMHG

## 2018-09-06 DIAGNOSIS — Z86.19 HISTORY OF COLD SORES: ICD-10-CM

## 2018-09-06 DIAGNOSIS — F41.9 ANXIETY: Primary | ICD-10-CM

## 2018-09-06 DIAGNOSIS — Z76.0 MEDICATION REFILL: ICD-10-CM

## 2018-09-06 RX ORDER — VALACYCLOVIR HYDROCHLORIDE 500 MG/1
TABLET, FILM COATED ORAL
Qty: 30 TAB | Refills: 5 | Status: CANCELLED | OUTPATIENT
Start: 2018-09-06

## 2018-09-06 RX ORDER — LORATADINE AND PSEUDOEPHEDRINE 10; 240 MG/1; MG/1
1 TABLET, EXTENDED RELEASE ORAL DAILY
Qty: 90 TAB | Refills: 3 | Status: SHIPPED | OUTPATIENT
Start: 2018-09-06

## 2018-09-06 RX ORDER — MUPIROCIN 20 MG/G
OINTMENT TOPICAL DAILY
Qty: 22 G | Refills: 0 | Status: SHIPPED | OUTPATIENT
Start: 2018-09-06

## 2018-09-06 RX ORDER — TRETINOIN 0.5 MG/G
CREAM TOPICAL
Qty: 45 G | Refills: 1 | Status: SHIPPED | OUTPATIENT
Start: 2018-09-06

## 2018-09-06 RX ORDER — SUMATRIPTAN 20 MG/1
SPRAY NASAL
Qty: 6 CONTAINER | Refills: 1 | Status: SHIPPED | OUTPATIENT
Start: 2018-09-06 | End: 2019-08-06 | Stop reason: SDUPTHER

## 2018-09-06 RX ORDER — VALACYCLOVIR HYDROCHLORIDE 500 MG/1
TABLET, FILM COATED ORAL
Qty: 30 TAB | Refills: 5 | Status: SHIPPED | OUTPATIENT
Start: 2018-09-06 | End: 2019-02-19 | Stop reason: SDUPTHER

## 2018-09-06 RX ORDER — BUTALBITAL, ASPIRIN AND CAFFEINE 50; 325; 40 MG/1; MG/1; MG/1
1 TABLET ORAL
Qty: 30 TAB | Refills: 1 | Status: SHIPPED | OUTPATIENT
Start: 2018-09-06

## 2018-09-06 RX ORDER — LORAZEPAM 0.5 MG/1
0.5 TABLET ORAL
Qty: 60 TAB | Refills: 0 | Status: CANCELLED | OUTPATIENT
Start: 2018-09-06

## 2018-09-06 RX ORDER — LORAZEPAM 0.5 MG/1
0.5 TABLET ORAL
Qty: 60 TAB | Refills: 0 | Status: SHIPPED | OUTPATIENT
Start: 2018-09-06 | End: 2019-02-19 | Stop reason: SDUPTHER

## 2018-09-06 RX ORDER — TRETINOIN 0.5 MG/G
CREAM TOPICAL
Qty: 45 G | Refills: 5 | Status: SHIPPED | OUTPATIENT
Start: 2018-09-06 | End: 2019-08-06 | Stop reason: SDUPTHER

## 2018-09-06 RX ORDER — LOTEPREDNOL ETABONATE 5 MG/ML
1 SUSPENSION/ DROPS OPHTHALMIC 4 TIMES DAILY
Qty: 15 ML | Refills: 2 | Status: SHIPPED | OUTPATIENT
Start: 2018-09-06 | End: 2019-08-06 | Stop reason: SDUPTHER

## 2018-09-06 RX ORDER — SUMATRIPTAN 100 MG/1
100 TABLET, FILM COATED ORAL
Qty: 10 TAB | Refills: 2 | Status: SHIPPED | OUTPATIENT
Start: 2018-09-06 | End: 2018-09-06

## 2018-09-06 NOTE — PROGRESS NOTES
ROOM # 16 Identified pt with two pt identifiers(name and ). Reviewed record in preparation for visit and have obtained necessary documentation. Chief Complaint Patient presents with  Medication Refill [place] preferred language for health care discussion is english/other. Is the patient using any DME equipment during OV? NO [place] is due for: 
Health Maintenance Due Topic  Hepatitis C Screening  COLONOSCOPY  DTaP/Tdap/Td series (1 - Tdap)  BREAST CANCER SCRN MAMMOGRAM   
 ZOSTER VACCINE AGE 60>   
 Bone Densitometry (Dexa) Screening  Pneumococcal 65+ Low/Medium Risk (1 of 2 - PCV13)  GLAUCOMA SCREENING Q2Y  Influenza Age 5 to Adult Health Maintenance reviewed and discussed per provider Please order/place referral if appropriate. Advance Directive: 1. Do you have an advance directive in place? Patient Reply: NO 
 
2. If not, would you like material regarding how to put one in place? NO Coordination of Care: 1. Have you been to the ER, urgent care clinic since your last visit? Hospitalized since your last visit? NO 
 
2. Have you seen or consulted any other health care providers outside of the [address] [place] [address] since your last visit? Include any pap smears or colon screening. NO Patient is accompanied by self I have received verbal consent from [place] to discuss any/all medical information while they are present in the room. Learning Assessment: 
Learning Assessment 3/16/2015 PRIMARY LEARNER Patient HIGHEST LEVEL OF EDUCATION - PRIMARY LEARNER  4 YEARS OF COLLEGE  
BARRIERS PRIMARY LEARNER NONE PRIMARY LANGUAGE ENGLISH  
LEARNER PREFERENCE PRIMARY READING  
ANSWERED BY patrient RELATIONSHIP SELF Depression Screening: PHQ over the last two weeks 2018 2017 2016 3/21/2016 3/16/2015 Little interest or pleasure in doing things Not at all Not at all Not at all Not at all Not at all Feeling down, depressed, irritable, or hopeless Not at all Not at all Not at all Not at all Not at all Total Score PHQ 2 0 0 0 0 0 Abuse Screening: 
Abuse Screening Questionnaire 1/22/2018 Do you ever feel afraid of your partner? Eboni Singh Are you in a relationship with someone who physically or mentally threatens you? Eboni Singh Is it safe for you to go home? Michael Eason Fall Risk Fall Risk Assessment, last 12 mths 9/6/2018 Able to walk? Yes Fall in past 12 months?  No

## 2018-09-06 NOTE — PATIENT INSTRUCTIONS

## 2018-09-06 NOTE — PROGRESS NOTES
HISTORY OF PRESENT ILLNESS Santosh Valenzuela is a 79 y.o. female. HPI Comments: 78 yo female here for f/u anxiety, medication refills. No complaints at this time. Anxiety/mood sx stable. Tolerating medication. Has not had labs for some time but does not wish to have these done. She also declines mammograms. Does regular SBE. Declines CRC screening. Has no FH CA. Exercises regularly. Eats healthy diet. Review of Systems Constitutional: Negative for chills, fever and weight loss. HENT: Negative for congestion and ear pain. Eyes: Negative for blurred vision and pain. Respiratory: Negative for cough and shortness of breath. Cardiovascular: Negative for chest pain, palpitations and leg swelling. Gastrointestinal: Negative for nausea and vomiting. Genitourinary: Negative for frequency and urgency. Musculoskeletal: Negative for joint pain and myalgias. Skin: Negative for itching and rash. Neurological: Negative for dizziness, tingling and headaches. Psychiatric/Behavioral: Negative for depression. The patient is nervous/anxious. Past Medical History:  
Diagnosis Date  Anxiety  History of shingles   
 since age 17  Migraine Current Outpatient Prescriptions on File Prior to Visit Medication Sig Dispense Refill  RETIN-A 0.05 % topical cream APPLY THIN FILM TOPICALLY EVERY EVENING 45 g 1  
 Estradiol (EVAMIST) 1.53 mg/spray (1.7%) spry 4 sprays to affected area daily 6 Bottle 5  
 LORazepam (ATIVAN) 0.5 mg tablet Take 1 Tab by mouth every eight (8) hours as needed for Anxiety. Indications: anxiety 60 Tab 0  
 SUMAtriptan (IMITREX) 20 mg/actuation nasal spray SPRAY 1 SPRAY IN BOTH NOSTRILS AS NEEDED FOR MIGRAINE (MAXIMUM OF 40MG IN 24HRS) 6 Container 1  
 loteprednol etabonate (LOTEMAX) 0.5 % ophthalmic suspension Administer 1 Drop to both eyes four (4) times daily. Indications: Allergic Conjunctivitis 15 mL 2  tretinoin (RETIN-A) 0.05 % topical cream APPLY THIN FILM TOPICALLY EVERY EVENING 45 g 1  valACYclovir (VALTREX) 500 mg tablet TAKE 1 TABLET TWICE A DAY 30 Tab 5  SUMAtriptan (IMITREX) 100 mg tablet Take 1 Tab by mouth once as needed for Migraine for up to 1 dose. 10 Tab 2  
 loratadine-pseudoephedrine (CLARITIN-D 24-HOUR)  mg per tablet Take 1 Tab by mouth daily.  butalbital-aspirin-caffeine (FIORINAL) -40 mg tablet Take 1 Tab by mouth every six (6) hours as needed for Pain. 30 Tab 1  
 olopatadine 0.7 % drop Apply 1 Drop to eye daily. 2.5 mL 5  
 mupirocin (BACTROBAN) 2 % ointment Apply  to affected area daily. 22 g 0 No current facility-administered medications on file prior to visit. Social History Substance Use Topics  Smoking status: Never Smoker  Smokeless tobacco: Never Used  Alcohol use No  
 
Physical Exam  
Constitutional: She appears well-developed and well-nourished. No distress. /76 (BP 1 Location: Right arm, BP Patient Position: Sitting)  Pulse 83  Temp 96.7 °F (35.9 °C) (Oral)   Resp 22  Ht 5' 2\" (1.575 m)  Wt 110 lb 6.4 oz (50.1 kg)  SpO2 99%  BMI 20.19 kg/m2 Eyes: EOM are normal. Right eye exhibits no discharge. Left eye exhibits no discharge. No scleral icterus. Neck: Neck supple. Cardiovascular: Normal rate, regular rhythm and normal heart sounds. Exam reveals no gallop and no friction rub. No murmur heard. Pulmonary/Chest: Effort normal and breath sounds normal. No respiratory distress. She has no wheezes. She has no rales. Musculoskeletal: She exhibits no edema or tenderness. Lymphadenopathy:  
  She has no cervical adenopathy. Neurological: She is alert. She exhibits normal muscle tone. Skin: Skin is warm and dry. Psychiatric: She has a normal mood and affect. No results found for: NA, K, CL, CO2, AGAP, GLU, BUN, CREA, BUCR, GFRAA, GFRNA, CA, TBIL, TBILI, GPT, SGOT, AP, TP, ALB, GLOB, AGRAT, ALT No results found for: WBC, WBCLT, HGBPOC, HGB, HGBP, HCTPOC, HCT, PHCT, RBCH, PLT, MCV, HGBEXT, HCTEXT, PLTEXT No results found for: CHOL, CHOLPOCT, CHOLX, CHLST, CHOLV, HDL, HDLPOC, LDL, LDLCPOC, LDLC, DLDLP, VLDLC, VLDL, TGLX, TRIGL, TRIGP, TGLPOCT, CHHD, CHHDX [facility] labs 3/7/11: glc 125; creatinine 0.8; H/H 13.7/41.7 ASSESSMENT and PLAN 
  ICD-10-CM ICD-9-CM 1. Anxiety F41.9 300.00 LORazepam (ATIVAN) 0.5 mg tablet 2. Medication refill Z76.0 V68.1 tretinoin (RETIN-A) 0.05 % topical cream  
   Estradiol (EVAMIST) 1.53 mg/spray (1.7%) spry SUMAtriptan (IMITREX) 20 mg/actuation nasal spray  
   loteprednol etabonate (LOTEMAX) 0.5 % ophthalmic suspension  
   tretinoin (RETIN-A) 0.05 % topical cream  
   SUMAtriptan (IMITREX) 100 mg tablet  
   loratadine-pseudoephedrine (CLARITIN-D 24-HOUR)  mg per tablet  
   butalbital-aspirin-caffeine (FIORINAL) -40 mg tablet  
   olopatadine 0.7 % drop  
   mupirocin (BACTROBAN) 2 % ointment  
   valACYclovir (VALTREX) 500 mg tablet 3. History of cold sores Z86.19 V12.09 valACYclovir (VALTREX) 500 mg tablet Anxiety stable on current medication Medication refilled. Declines colo, mammo, vaccines other than shingrix, screening labs at this time.

## 2018-09-06 NOTE — MR AVS SNAPSHOT
[address] [place] [address] 
 
 
 [address] [facility] [address] 
[phone] Patient: Mima Rivera MRN: [id number] [facility]:0/97/6360 Visit Information Date & Time Provider Department Dept. Phone Encounter #  
 9/6/2018  7:15 AM Daisy Bernstein, [address] [phone] [id number] Follow-up Instructions Return if symptoms worsen or fail to improve. Upcoming Health Maintenance Date Due Hepatitis C Screening 1950 COLONOSCOPY 3/14/1970 DTaP/Tdap/Td series (1 - Tdap) 3/14/1973 BREAST CANCER SCRN MAMMOGRAM 3/14/2002 ZOSTER VACCINE AGE 60> 1/14/2012 Bone Densitometry (Dexa) Screening 3/14/2017 Pneumococcal 65+ Low/Medium Risk (1 of 2 - PCV13) 3/14/2017 GLAUCOMA SCREENING Q2Y 3/21/2018 Influenza Age 5 to Adult 8/1/2018 MEDICARE YEARLY EXAM 1/22/2019 Allergies as of 9/6/2018  Review Complete On: 9/6/2018 By: Bang Irizarry Severity Noted Reaction Type Reactions Keflex [Cephalexin]  01/11/2018    Rash And GI sxs Sulfa (Sulfonamide Antibiotics)  03/02/2010    Unknown (comments) Pt says that she is allergic to \"all antibiotics except Amoxicillin\" Zithromax [Azithromycin]  01/11/2018    Other (comments) GI upset Current Immunizations  Never Reviewed No immunizations on file. Not reviewed this visit You Were Diagnosed With   
  
 Codes Comments Anxiety    -  Primary ICD-10-CM: F41.9 ICD-9-CM: 300.00 Medication refill     ICD-10-CM: Z76.0 ICD-9-CM: V68.1 History of cold sores     ICD-10-CM: Z86.19 ICD-9-CM: V12.09 Vitals BP Pulse Temp Resp Height(growth percentile) Weight(growth percentile) 111/76 (BP 1 Location: Right arm, BP Patient Position: Sitting) 83 96.7 °F (35.9 °C) (Oral) 22 5' 2\" (1.575 m) 110 lb 6.4 oz (50.1 kg) SpO2 BMI OB Status Smoking Status 99% 20.19 kg/m2 Hysterectomy Never Smoker BMI and BSA Data Body Mass Index Body Surface Area  
 20.19 kg/m 2 1.48 m 2 Preferred Pharmacy Pharmacy Name Phone [facility] - [address] [phone] Your Updated Medication List  
  
   
This list is accurate as of 9/6/18  7:54 AM.  Always use your most recent med list.  
  
  
  
  
 butalbital-aspirin-caffeine -40 mg tablet Commonly known as:  Prudence Yee Take 1 Tab by mouth every six (6) hours as needed for Pain. Estradiol 1.53 mg/spray (1.7%) Spry Commonly known as:  EVAMIST  
4 sprays to affected area daily  
  
 loratadine-pseudoephedrine  mg per tablet Commonly known as:  CLARITIN-D 24-hour Take 1 Tab by mouth daily. LORazepam 0.5 mg tablet Commonly known as:  ATIVAN Take 1 Tab by mouth every eight (8) hours as needed for Anxiety. Indications: anxiety  
  
 loteprednol etabonate 0.5 % ophthalmic suspension Commonly known as:  Aditya Delgado Administer 1 Drop to both eyes four (4) times daily. Indications: Allergic Conjunctivitis  
  
 mupirocin 2 % ointment Commonly known as:  [facility] Apply  to affected area daily. olopatadine 0.7 % Drop Apply 1 Drop to eye daily. SUMAtriptan 100 mg tablet Commonly known as:  IMITREX Take 1 Tab by mouth once as needed for Migraine for up to 1 dose. SUMAtriptan 20 mg/actuation nasal spray Commonly known as:  IMITREX  
SPRAY 1 SPRAY IN BOTH NOSTRILS AS NEEDED FOR MIGRAINE (MAXIMUM OF 40MG IN 24HRS) * tretinoin 0.05 % topical cream  
Commonly known as:  RETIN-A  
APPLY THIN FILM TOPICALLY EVERY EVENING  
  
 * tretinoin 0.05 % topical cream  
Commonly known as:  RETIN-A  
APPLY THIN FILM TOPICALLY EVERY EVENING  
  
 valACYclovir 500 mg tablet Commonly known as:  VALTREX  
TAKE 1 TABLET TWICE A DAY * Notice: This list has 2 medication(s) that are the same as other medications prescribed for you.  Read the directions carefully, and ask your doctor or other care provider to review them with you. Prescriptions Printed Refills  
 butalbital-aspirin-caffeine (FIORINAL) -40 mg tablet 1 Sig: Take 1 Tab by mouth every six (6) hours as needed for Pain. Class: Print Route: Oral  
 LORazepam (ATIVAN) 0.5 mg tablet 0 Sig: Take 1 Tab by mouth every eight (8) hours as needed for Anxiety. Indications: anxiety Class: Print Route: Oral  
  
Prescriptions Sent to Pharmacy Refills  
 tretinoin (RETIN-A) 0.05 % topical cream 5 Sig: APPLY THIN FILM TOPICALLY EVERY EVENING Class: Normal  
 Pharmacy: [address] [place] [address] Ph #: [phone] Estradiol (EVAMIST) 1.53 mg/spray (1.7%) spry 5 Si sprays to affected area daily Class: Normal  
 Pharmacy: [address] [place] [address] Ph #: [phone] SUMAtriptan (IMITREX) 20 mg/actuation nasal spray 1 Sig: SPRAY 1 SPRAY IN BOTH NOSTRILS AS NEEDED FOR MIGRAINE (MAXIMUM OF 40MG IN 24HRS) Class: Normal  
 Pharmacy: [address] Ph #: [phone]  
 loteprednol etabonate (LOTEMAX) 0.5 % ophthalmic suspension 2 Sig: Administer 1 Drop to both eyes four (4) times daily. Indications: Allergic Conjunctivitis Class: Normal  
 Pharmacy: [address] [place] [address] Ph #: [phone] Route: Both Eyes  
 tretinoin (RETIN-A) 0.05 % topical cream 1 Sig: APPLY THIN FILM TOPICALLY EVERY EVENING Class: Normal  
 Pharmacy: [address] [place] [address] Ph #: [phone] SUMAtriptan (IMITREX) 100 mg tablet 2 Sig: Take 1 Tab by mouth once as needed for Migraine for up to 1 dose. Class: Normal  
 Pharmacy: [address] [place] [address] Ph #: [phone]  Route: Oral  
 loratadine-pseudoephedrine (CLARITIN-D 24-HOUR)  mg per tablet 3 Sig: Take 1 Tab by mouth daily. Class: Normal  
 Pharmacy: [address] , [address] [place] [address] Ph #: [phone] Route: Oral  
 olopatadine 0.7 % drop 5 Sig: Apply 1 Drop to eye daily. Class: Normal  
 Pharmacy: [facility], [address] [place] [address] Ph #: [phone] Route: Ophthalmic  
 mupirocin (BACTROBAN) 2 % ointment 0 Sig: Apply  to affected area daily. Class: Normal  
 Pharmacy: [address] [facility], [address] [place] [address] Ph #: [phone] Route: Topical  
 valACYclovir (VALTREX) 500 mg tablet 5 Sig: TAKE 1 TABLET TWICE A DAY Class: Normal  
 Pharmacy: [address] Ph #: [phone] Follow-up Instructions Return if symptoms worsen or fail to improve. Patient Instructions Anxiety Disorder: Care Instructions Your Care Instructions Anxiety is a normal reaction to stress. Difficult situations can cause you to have symptoms such as sweaty palms and a nervous feeling. In an anxiety disorder, the symptoms are far more severe. Constant worry, muscle tension, trouble sleeping, nausea and diarrhea, and other symptoms can make normal daily activities difficult or impossible. These symptoms may occur for no reason, and they can affect your work, school, or social life. Medicines, counseling, and self-care can all help. Follow-up care is a key part of your treatment and safety. Be sure to make and go to all appointments, and call your doctor if you are having problems. It's also a good idea to know your test results and keep a list of the medicines you take. How can you care for yourself at home? · Take medicines exactly as directed. Call your doctor if you think you are having a problem with your medicine. · Go to your counseling sessions and follow-up appointments. · Recognize and accept your anxiety. Then, when you are in a situation that makes you anxious, say to yourself, \"This is not an emergency. I feel uncomfortable, but I am not in danger. I can keep going even if I feel anxious. \" · Be kind to your body: ¨ Relieve tension with exercise or a massage. ¨ Get enough rest. 
¨ Avoid alcohol, caffeine, nicotine, and illegal drugs. They can increase your anxiety level and cause sleep problems. ¨ Learn and do relaxation techniques. See below for more about these techniques. · Engage your mind. Get out and do something you enjoy. Go to a funny movie, or take a walk or hike. Plan your day. Having too much or too little to do can make you anxious. · Keep a record of your symptoms. Discuss your fears with a good friend or family member, or join a support group for people with similar problems. Talking to others sometimes relieves stress. · Get involved in social groups, or volunteer to help others. Being alone sometimes makes things seem worse than they are. · Get at least 30 minutes of exercise on most days of the week to relieve stress. Walking is a good choice. You also may want to do other activities, such as running, swimming, cycling, or playing tennis or team sports. Relaxation techniques Do relaxation exercises 10 to 20 minutes a day. You can play soothing, relaxing music while you do them, if you wish. · Tell others in your house that you are going to do your relaxation exercises. Ask them not to disturb you. · Find a comfortable place, away from all distractions and noise. · Lie down on your back, or sit with your back straight. · Focus on your breathing. Make it slow and steady. · Breathe in through your nose. Breathe out through either your nose or mouth. · Breathe deeply, filling up the area between your navel and your rib cage. Breathe so that your belly goes up and down. · Do not hold your breath. · Breathe like this for 5 to 10 minutes. Notice the feeling of calmness throughout your whole body. As you continue to breathe slowly and deeply, relax by doing the following for another 5 to 10 minutes: · Tighten and relax each muscle group in your body. You can begin at your toes and work your way up to your head. · Imagine your muscle groups relaxing and becoming heavy. · Empty your mind of all thoughts. · Let yourself relax more and more deeply. · Become aware of the state of calmness that surrounds you. · When your relaxation time is over, you can bring yourself back to alertness by moving your fingers and toes and then your hands and feet and then stretching and moving your entire body. Sometimes people fall asleep during relaxation, but they usually wake up shortly afterward. · Always give yourself time to return to full alertness before you drive a car or do anything that might cause an accident if you are not fully alert. Never play a relaxation tape while you drive a car. When should you call for help? Call 911 anytime you think you may need emergency care. For example, call if: 
  · You feel you cannot stop from hurting yourself or someone else.  
Chastity Campos the numbers for these national suicide hotlines: 6-083-659-TALK (9-294-776-947.719.9946) and 1-899-XJCAWAJ (3-265-378-5210). If you or someone you know talks about suicide or feeling hopeless, get help right away. 
 Watch closely for changes in your health, and be sure to contact your doctor if: 
  · You have anxiety or fear that affects your life.  
  · You have symptoms of anxiety that are new or different from those you had before. Where can you learn more? Go to http://horacio-yordan.info/. Enter P754 in the search box to learn more about \"Anxiety Disorder: Care Instructions. \" Current as of: December 7, 2017 Content Version: 11.7 © 9119-7410 [employer].  Care instructions adapted under license by [address] (which disclaims liability or warranty for this information). If you have questions about a medical condition or this instruction, always ask your healthcare professional. [address] any warranty or liability for your use of this information. Introducing [facility]! [employer] introduces Real Time Content patient portal. Now you can access parts of your medical record, email your doctor's office, and request medication refills online. 1. In your internet browser, go to [email or web address] 2. Click on the First Time User? Click Here link in the Sign In box. You will see the New Member Sign Up page. 3. Enter your Real Time Content Access Code exactly as it appears below. You will not need to use this code after youve completed the sign-up process. If you do not sign up before the expiration date, you must request a new code. · Real Time Content Access Code: Jose Valiente Expires: 12/5/2018  7:54 AM 
 
4. Enter the last four digits of your Social Security Number (xxxx) and Date of Birth (mm/dd/yyyy) as indicated and click Submit. You will be taken to the next sign-up page. 5. Create a Real Time Content ID. This will be your Real Time Content login ID and cannot be changed, so think of one that is secure and easy to remember. 6. Create a Real Time Content password. You can change your password at any time. 7. Enter your Password Reset Question and Answer. This can be used at a later time if you forget your password. 8. Enter your e-mail address. You will receive e-mail notification when new information is available in [address] 9. Click Sign Up. You can now view and download portions of your medical record. 10. Click the Download Summary menu link to download a portable copy of your medical information. If you have questions, please visit the Frequently Asked Questions section of the Real Time Content website.  Remember, Real Time Content is NOT to be used for urgent needs. For medical emergencies, dial 911. Now available from your iPhone and Android! Please provide this summary of care documentation to your next provider. Your primary care clinician is listed as Elias Prescott. If you have any questions after today's visit, please call [phone].

## 2018-09-12 ENCOUNTER — TELEPHONE (OUTPATIENT)
Dept: INTERNAL MEDICINE CLINIC | Age: 68
End: 2018-09-12

## 2018-09-12 NOTE — TELEPHONE ENCOUNTER
Incoming call from pharm. 2 pt identifiers confirmed. Pharm states that they need to change pt medication from tablets to capsules because they do not have tablets available. Per Dr Imani Ricardo okay to change from tablets to capsules. No other questions or concerns at this time.

## 2018-11-20 DIAGNOSIS — Z76.0 MEDICATION REFILL: ICD-10-CM

## 2018-11-20 NOTE — TELEPHONE ENCOUNTER
Requested Prescriptions     Pending Prescriptions Disp Refills    Estradiol (EVAMIST) 1.53 mg/spray (1.7%) spry 6 Bottle 5     Si sprays to affected area daily

## 2019-02-19 DIAGNOSIS — F41.9 ANXIETY: ICD-10-CM

## 2019-02-19 DIAGNOSIS — Z86.19 HISTORY OF COLD SORES: ICD-10-CM

## 2019-02-19 DIAGNOSIS — Z76.0 MEDICATION REFILL: ICD-10-CM

## 2019-02-19 RX ORDER — VALACYCLOVIR HYDROCHLORIDE 500 MG/1
TABLET, FILM COATED ORAL
Qty: 30 TAB | Refills: 5 | Status: SHIPPED | OUTPATIENT
Start: 2019-02-19 | End: 2019-08-06 | Stop reason: SDUPTHER

## 2019-02-19 RX ORDER — LORAZEPAM 0.5 MG/1
0.5 TABLET ORAL
Qty: 60 TAB | Refills: 0 | Status: SHIPPED | OUTPATIENT
Start: 2019-02-19 | End: 2019-08-06 | Stop reason: SDUPTHER

## 2019-02-19 NOTE — TELEPHONE ENCOUNTER
Requested Prescriptions     Pending Prescriptions Disp Refills    valACYclovir (VALTREX) 500 mg tablet 30 Tab 5     Sig: TAKE 1 TABLET TWICE A DAY

## 2019-02-19 NOTE — TELEPHONE ENCOUNTER
Requested Prescriptions     Pending Prescriptions Disp Refills    LORazepam (ATIVAN) 0.5 mg tablet 60 Tab 0     Sig: Take 1 Tab by mouth every eight (8) hours as needed for Anxiety.

## 2019-02-19 NOTE — TELEPHONE ENCOUNTER
PCP: Silverio Pack MD    Last appt: 9/6/2018  No future appointments. Requested Prescriptions     Pending Prescriptions Disp Refills    LORazepam (ATIVAN) 0.5 mg tablet 60 Tab 0     Sig: Take 1 Tab by mouth every eight (8) hours as needed for Anxiety. Request for a 30 or 90 day supply? Provider Discretion    Pharmacy: Print     Other Comments:   printed and placed in PCP medication refill review folder.      Last UDS date:   Pain contract signed:

## 2019-06-13 ENCOUNTER — OFFICE VISIT (OUTPATIENT)
Dept: INTERNAL MEDICINE CLINIC | Age: 69
End: 2019-06-13

## 2019-06-13 VITALS
SYSTOLIC BLOOD PRESSURE: 135 MMHG | WEIGHT: 116 LBS | OXYGEN SATURATION: 96 % | RESPIRATION RATE: 16 BRPM | HEART RATE: 84 BPM | TEMPERATURE: 96 F | BODY MASS INDEX: 21.35 KG/M2 | DIASTOLIC BLOOD PRESSURE: 73 MMHG | HEIGHT: 62 IN

## 2019-06-13 DIAGNOSIS — L08.9 SOFT TISSUE INFECTION: Primary | ICD-10-CM
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
## 2019-06-13 RX ORDER — DOXYCYCLINE 100 MG/1
100 TABLET ORAL 2 TIMES DAILY
Qty: 20 TAB | Refills: 0 | Status: SHIPPED | OUTPATIENT
Start: 2019-06-13 | End: 2019-06-23

## 2019-06-13 NOTE — PROGRESS NOTES
[id number] Paula Doherty [address] presents today for   Chief Complaint   Patient presents with    Mass     mass on base of the head       [place] preferred language for health care discussion is english/other. Is someone accompanying this pt? No    Is the patient using any DME equipment during OV? No    Depression Screening:  3 most recent [facility] Screens 6/13/2019 9/6/2018 2/27/2017 7/13/2016 3/21/2016 3/16/2015   Little interest or pleasure in doing things Not at all Not at all Not at all Not at all Not at all Not at all   Feeling down, depressed, irritable, or hopeless Not at all Not at all Not at all Not at all Not at all Not at all   Total Score PHQ 2 0 0 0 0 0 0       Learning Assessment:  Learning Assessment 3/16/2015   PRIMARY LEARNER Patient   HIGHEST LEVEL OF EDUCATION - PRIMARY LEARNER  4 YEARS OF COLLEGE   BARRIERS PRIMARY LEARNER NONE   PRIMARY LANGUAGE ENGLISH   LEARNER PREFERENCE PRIMARY READING   ANSWERED BY patrient   RELATIONSHIP SELF       Abuse Screening:  Abuse Screening Questionnaire 1/22/2018   Do you ever feel afraid of your partner? N   Are you in a relationship with someone who physically or mentally threatens you? N   Is it safe for you to go home? Y       Fall Risk  Fall Risk Assessment, last 12 mths 6/13/2019 9/6/2018   Able to walk? Yes Yes   Fall in past 12 months? No No       Health Maintenance reviewed and discussed per provider. Yes    Tim Erickson is due for   Health Maintenance Due   Topic Date Due    Hepatitis C Screening  1950    COLONOSCOPY  03/14/1970    DTaP/Tdap/Td series (1 - Tdap) 03/14/1973    Shingrix Vaccine Age 50> (1 of 2) 03/14/2002    BREAST CANCER SCRN MAMMOGRAM  03/14/2002    Bone Densitometry (Dexa) Screening  03/14/2017    Pneumococcal 65+ years (1 of 2 - PCV13) 03/14/2017    GLAUCOMA SCREENING Q2Y  03/21/2018    MEDICARE YEARLY EXAM  01/22/2019     Please order/place referral if appropriate. Advance Directive:  1.  Do you have an advance directive in place? Patient Reply: No    2. If not, would you like material regarding how to put one in place? Patient Reply: No    Coordination of Care:  1. Have you been to the ER, urgent care clinic since your last visit? Hospitalized since your last visit? No    2. Have you seen or consulted any other health care providers outside of the [address] [place] [address] since your last visit? Include any pap smears or colon screening.  NO

## 2019-06-13 NOTE — PATIENT INSTRUCTIONS
Tick Bite: Care Instructions Your Care Instructions Ticks are small spiderlike animals. They bite to fasten themselves onto your skin and feed on your blood. Ticks can carry diseases. But most ticks do not carry diseases, and most tick bites do not cause serious health problems. Some people may have an allergic reaction to a tick bite. This reaction may be mild, with symptoms like itching and swelling. In rare cases, a severe allergic reaction may occur. Most of the time, all you need to do for a tick bite is relieve any symptoms you may have. Follow-up care is a key part of your treatment and safety. Be sure to make and go to all appointments, and call your doctor if you are having problems. It's also a good idea to know your test results and keep a list of the medicines you take. How can you care for yourself at home? · Put ice or a cold pack on the bite for 15 to 20 minutes once an hour. Put a thin cloth between the ice and your skin. · Try an over-the-counter medicine to relieve itching, redness, swelling, and pain. Be safe with medicines. Read and follow all instructions on the label. ? Take an antihistamine medicine, such as a nondrowsy one like loratadine (Claritin) or one that might make you sleepy like diphenhydramine (Benadryl). These medicines may help relieve itching, redness, and swelling. ? Use a spray of local anesthetic that contains benzocaine, such as Solarcaine. It may help relieve pain. If your skin reacts to the spray, stop using it. ? Put calamine lotion on the skin. It may help relieve itching. To avoid tick bites · Avoid ticks: 
? Learn where ticks are found in your community, and stay away from those areas if possible. ? Cover as much of your body as possible when you work or play in grassy or wooded areas. ? Use insect repellents, such as products containing DEET. You can spray them on your skin.  
? Take steps to control ticks on your property if you live in an area where Lyme disease occurs. Clear leaves, brush, tall grasses, woodpiles, and stone fences from around your house and the edges of your yard or garden. This may help get rid of ticks. · When you come in from outdoors, check your body for ticks, including your groin, head, and underarms. The ticks may be about the size of a sesame seed. If no one else can help you check for ticks on your scalp, comb your hair with a fine-tooth comb. · If you find a tick, remove it quickly. Use tweezers to grasp the tick as close to its mouth (the part in your skin) as possible. Slowly pull the tick straight outdo not twist or yankuntil its mouth releases from your skin. If part of the tick stays in the skin, leave it alone. It will likely come out on its own in a few days. · Ticks can come into your house on clothing, outdoor gear, and pets. These ticks can fall off and attach to you. ? Check your clothing and outdoor gear. Remove any ticks you find. Then put your clothing in a clothes dryer on high heat for 1 hour to kill any ticks that might remain. ? Check your pets for ticks after they have been outdoors. · When hiking in the woods, carry a small dry jar or ziplock bag. If you find a tick on your body, remove the tick and put it in the jar or bag. Store the container in the freezer so you can give it to your doctor if symptoms develop. The tick can be tested to learn whether it is carrying the bacteria that cause Lyme disease. When should you call for help? Call 911 anytime you think you may need emergency care. For example, call if: 
  · You have symptoms of a severe allergic reaction. These may include: 
? Sudden raised, red areas (hives) all over your body. ? Swelling of the throat, mouth, lips, or tongue. ? Trouble breathing. ? Passing out (losing consciousness). Or you may feel very lightheaded or suddenly feel weak, confused, or restless.  
 Call your doctor now or seek immediate medical care if:   · You have signs of infection, such as: 
? Increased pain, swelling, warmth, or redness around the bite. ? Red streaks leading from the bite. ? Pus draining from the bite. ? A fever.  
 Watch closely for changes in your health, and be sure to contact your doctor if: 
  · You develop a new rash.  
  · You have joint pain.  
  · You are very tired.  
  · You have flu-like symptoms.  
  · You have symptoms for more than 1 week. Where can you learn more? Go to http://horacio-yordan.info/. Enter S677 in the search box to learn more about \"Tick Bite: Care Instructions. \" Current as of: September 23, 2018 Content Version: 11.9 © 5912-5112 [employer]. Care instructions adapted under license by [employer] (which disclaims liability or warranty for this information). If you have questions about a medical condition or this instruction, always ask your healthcare professional. [place] [address] any warranty or liability for your use of this information.

## 2019-06-13 NOTE — PROGRESS NOTES
HISTORY OF PRESENT ILLNESS  Alex Coreas is a 81 y.o. female. Here for evaluation of R occipital swelling that she had noted for past few weeks. No fevers, chills but area tender to touch. She had a tick bite on L upper thigh around similar time. Started taking doxycycline which she had remaining from a prior prescription and noted decrease in her symptoms. Review of Systems   Constitutional: Negative for chills, fever and weight loss. HENT: Negative for congestion and sinus pain. Eyes: Negative for blurred vision and pain. Respiratory: Negative for cough and shortness of breath. Cardiovascular: Negative for chest pain, palpitations and leg swelling. Gastrointestinal: Negative for nausea and vomiting. Musculoskeletal: Negative for joint pain and myalgias. Skin: Negative for itching and rash. Neurological: Negative for dizziness, tingling and headaches. Psychiatric/Behavioral: Negative for depression. The patient is not nervous/anxious. Past Medical History:   Diagnosis Date    Anxiety     History of shingles     since age 17    Migraine      Current Outpatient Medications on File Prior to Visit   Medication Sig Dispense Refill    valACYclovir (VALTREX) 500 mg tablet TAKE 1 TABLET TWICE A DAY 30 Tab 5    LORazepam (ATIVAN) 0.5 mg tablet Take 1 Tab by mouth every eight (8) hours as needed for Anxiety. 60 Tab 0    Estradiol (EVAMIST) 1.53 mg/spray (1.7%) spry 4 sprays to affected area daily 6 Bottle 5    tretinoin (RETIN-A) 0.05 % topical cream APPLY THIN FILM TOPICALLY EVERY EVENING 45 g 5    SUMAtriptan (IMITREX) 20 mg/actuation nasal spray SPRAY 1 SPRAY IN BOTH NOSTRILS AS NEEDED FOR MIGRAINE (MAXIMUM OF 40MG IN 24HRS) 6 Container 1    loteprednol etabonate (LOTEMAX) 0.5 % ophthalmic suspension Administer 1 Drop to both eyes four (4) times daily. Indications:  Allergic Conjunctivitis 15 mL 2    tretinoin (RETIN-A) 0.05 % topical cream APPLY THIN FILM TOPICALLY EVERY EVENING 45 g 1    loratadine-pseudoephedrine (CLARITIN-D 24-HOUR)  mg per tablet Take 1 Tab by mouth daily. 90 Tab 3    butalbital-aspirin-caffeine (FIORINAL) -40 mg tablet Take 1 Tab by mouth every six (6) hours as needed for Pain. 30 Tab 1    olopatadine 0.7 % drop Apply 1 Drop to eye daily. 2.5 mL 5    mupirocin (BACTROBAN) 2 % ointment Apply  to affected area daily. 22 g 0     No current facility-administered medications on file prior to visit. Physical Exam   Constitutional: She appears well-developed and well-nourished. No distress. HENT:   Head: Normocephalic and atraumatic. Skin:        Nursing note and vitals reviewed. No results found for: NA, K, CL, CO2, AGAP, GLU, BUN, CREA, BUCR, GFRAA, GFRNA, CA, TBIL, TBILI, GPT, SGOT, AP, TP, ALB, GLOB, AGRAT, ALT    ASSESSMENT and PLAN    ICD-10-CM ICD-9-CM    1. Soft tissue infection L08.9 686.9    2. Tick bite, initial encounter W57. XXXA 919.4      E906.4      Will complete course of doxycycline since sx improving with this. RTC if sx persist/worsen.

## 2019-08-06 ENCOUNTER — OFFICE VISIT (OUTPATIENT)
Dept: INTERNAL MEDICINE CLINIC | Age: 69
End: 2019-08-06

## 2019-08-06 VITALS
HEART RATE: 74 BPM | WEIGHT: 122.6 LBS | DIASTOLIC BLOOD PRESSURE: 88 MMHG | SYSTOLIC BLOOD PRESSURE: 136 MMHG | OXYGEN SATURATION: 97 % | HEIGHT: 62 IN | RESPIRATION RATE: 18 BRPM | TEMPERATURE: 96.5 F | BODY MASS INDEX: 22.56 KG/M2

## 2019-08-06 DIAGNOSIS — F41.9 ANXIETY: ICD-10-CM

## 2019-08-06 DIAGNOSIS — Z86.19 HISTORY OF COLD SORES: ICD-10-CM

## 2019-08-06 DIAGNOSIS — Z76.0 MEDICATION REFILL: ICD-10-CM

## 2019-08-06 RX ORDER — TRETINOIN 0.5 MG/G
CREAM TOPICAL
Qty: 45 G | Refills: 5 | Status: SHIPPED | OUTPATIENT
Start: 2019-08-06

## 2019-08-06 RX ORDER — VALACYCLOVIR HYDROCHLORIDE 500 MG/1
TABLET, FILM COATED ORAL
Qty: 30 TAB | Refills: 5 | Status: SHIPPED | OUTPATIENT
Start: 2019-08-06

## 2019-08-06 RX ORDER — SUMATRIPTAN 20 MG/1
SPRAY NASAL
Qty: 6 CONTAINER | Refills: 1 | Status: SHIPPED | OUTPATIENT
Start: 2019-08-06

## 2019-08-06 RX ORDER — LORAZEPAM 0.5 MG/1
0.5 TABLET ORAL
Qty: 60 TAB | Refills: 0 | Status: SHIPPED | OUTPATIENT
Start: 2019-08-06

## 2019-08-06 RX ORDER — LOTEPREDNOL ETABONATE 5 MG/ML
1 SUSPENSION/ DROPS OPHTHALMIC 4 TIMES DAILY
Qty: 15 ML | Refills: 2 | Status: SHIPPED | OUTPATIENT
Start: 2019-08-06

## 2019-08-06 NOTE — PROGRESS NOTES
Rm: 12    Chief Complaint   Patient presents with    Medication Refill     Depression Screening:  3 most recent PHQ Screens 8/6/2019 6/13/2019 9/6/2018 2/27/2017 7/13/2016 3/21/2016 3/16/2015   Little interest or pleasure in doing things Not at all Not at all Not at all Not at all Not at all Not at all Not at all   Feeling down, depressed, irritable, or hopeless Not at all Not at all Not at all Not at all Not at all Not at all Not at all   Total Score PHQ 2 0 0 0 0 0 0 0       Learning Assessment:  Learning Assessment 3/16/2015   PRIMARY LEARNER Patient   HIGHEST LEVEL OF EDUCATION - PRIMARY LEARNER  4 YEARS OF COLLEGE   BARRIERS PRIMARY LEARNER NONE   PRIMARY LANGUAGE ENGLISH   LEARNER PREFERENCE PRIMARY READING   ANSWERED BY patrient   RELATIONSHIP SELF       Abuse Screening:  Abuse Screening Questionnaire 1/22/2018   Do you ever feel afraid of your partner? N   Are you in a relationship with someone who physically or mentally threatens you? N   Is it safe for you to go home? Y       Health Maintenance reviewed and discussed per provider: yes     Coordination of Care:    1. Have you been to the ER, urgent care clinic since your last visit? Hospitalized since your last visit? no    2. Have you seen or consulted any other health care providers outside of the [address] [place] [address] since your last visit? Include any pap smears or colon screening.  no

## 2019-08-06 NOTE — PROGRESS NOTES
HISTORY OF PRESENT ILLNESS  Richmond Goodman is a 81 y.o. female. Here for refills of medication. Feeling well at this time. Tolerating medication without difficulty. Anxiety controlled with prn ativan. Review of Systems   Constitutional: Negative for chills, fever and weight loss. HENT: Negative for congestion and ear pain. Eyes: Negative for blurred vision and pain. Respiratory: Negative for cough and shortness of breath. Cardiovascular: Negative for chest pain, palpitations and leg swelling. Gastrointestinal: Negative for nausea and vomiting. Genitourinary: Negative for frequency and urgency. Musculoskeletal: Negative for joint pain and myalgias. Skin: Negative for itching and rash. Neurological: Negative for dizziness, tingling and headaches. Psychiatric/Behavioral: Negative for depression. The patient is not nervous/anxious. Past Medical History:   Diagnosis Date    Anxiety     History of shingles     since age 17    Migraine      Current Outpatient Medications on File Prior to Visit   Medication Sig Dispense Refill    valACYclovir (VALTREX) 500 mg tablet TAKE 1 TABLET TWICE A DAY 30 Tab 5    LORazepam (ATIVAN) 0.5 mg tablet Take 1 Tab by mouth every eight (8) hours as needed for Anxiety. 60 Tab 0    Estradiol (EVAMIST) 1.53 mg/spray (1.7%) spry 4 sprays to affected area daily 6 Bottle 5    tretinoin (RETIN-A) 0.05 % topical cream APPLY THIN FILM TOPICALLY EVERY EVENING 45 g 5    SUMAtriptan (IMITREX) 20 mg/actuation nasal spray SPRAY 1 SPRAY IN BOTH NOSTRILS AS NEEDED FOR MIGRAINE (MAXIMUM OF 40MG IN 24HRS) 6 Container 1    loteprednol etabonate (LOTEMAX) 0.5 % ophthalmic suspension Administer 1 Drop to both eyes four (4) times daily. Indications: Allergic Conjunctivitis 15 mL 2    tretinoin (RETIN-A) 0.05 % topical cream APPLY THIN FILM TOPICALLY EVERY EVENING 45 g 1    loratadine-pseudoephedrine (CLARITIN-D 24-HOUR)  mg per tablet Take 1 Tab by mouth daily.  90 Tab 3    butalbital-aspirin-caffeine (FIORINAL) -40 mg tablet Take 1 Tab by mouth every six (6) hours as needed for Pain. 30 Tab 1    olopatadine 0.7 % drop Apply 1 Drop to eye daily. 2.5 mL 5    mupirocin (BACTROBAN) 2 % ointment Apply  to affected area daily. 22 g 0     No current facility-administered medications on file prior to visit. Social History     Tobacco Use    Smoking status: Never Smoker    Smokeless tobacco: Never Used   Substance Use Topics    Alcohol use: No    Drug use: No     Physical Exam   Constitutional: She appears well-developed and well-nourished. No distress. Pulmonary/Chest: No respiratory distress. Neurological: She is alert. Psychiatric: She has a normal mood and affect. Nursing note and vitals reviewed. ASSESSMENT and PLAN    ICD-10-CM ICD-9-CM    1. Medication refill Z76.0 V68.1 Estradiol (EVAMIST) 1.53 mg/spray (1.7%) spry      loteprednol etabonate (LOTEMAX) 0.5 % ophthalmic suspension      SUMAtriptan (IMITREX) 20 mg/actuation nasal spray      tretinoin (RETIN-A) 0.05 % topical cream      valACYclovir (VALTREX) 500 mg tablet   2. History of cold sores Z86.19 V12.09 valACYclovir (VALTREX) 500 mg tablet   3. Anxiety F41.9 300.00 LORazepam (ATIVAN) 0.5 mg tablet   Stable. Medication refilled.